# Patient Record
Sex: MALE | URBAN - METROPOLITAN AREA
[De-identification: names, ages, dates, MRNs, and addresses within clinical notes are randomized per-mention and may not be internally consistent; named-entity substitution may affect disease eponyms.]

---

## 2020-11-11 ENCOUNTER — APPOINTMENT (OUTPATIENT)
Dept: URBAN - METROPOLITAN AREA CLINIC 254 | Age: 65
Setting detail: DERMATOLOGY
End: 2020-11-12

## 2020-11-11 VITALS — HEIGHT: 69 IN | WEIGHT: 175 LBS | RESPIRATION RATE: 14 BRPM

## 2020-11-11 DIAGNOSIS — L20.89 OTHER ATOPIC DERMATITIS: ICD-10-CM

## 2020-11-11 DIAGNOSIS — D485 NEOPLASM OF UNCERTAIN BEHAVIOR OF SKIN: ICD-10-CM

## 2020-11-11 PROBLEM — L20.84 INTRINSIC (ALLERGIC) ECZEMA: Status: ACTIVE | Noted: 2020-11-11

## 2020-11-11 PROBLEM — D48.5 NEOPLASM OF UNCERTAIN BEHAVIOR OF SKIN: Status: ACTIVE | Noted: 2020-11-11

## 2020-11-11 PROCEDURE — OTHER PRESCRIPTION: OTHER

## 2020-11-11 PROCEDURE — 88305 TISSUE EXAM BY PATHOLOGIST: CPT

## 2020-11-11 PROCEDURE — OTHER PRESCRIPTION SAMPLES PROVIDED: OTHER

## 2020-11-11 PROCEDURE — OTHER COUNSELING: OTHER

## 2020-11-11 PROCEDURE — OTHER PATHOLOGY BILLING: OTHER

## 2020-11-11 PROCEDURE — 11102 TANGNTL BX SKIN SINGLE LES: CPT

## 2020-11-11 PROCEDURE — OTHER ADDITIONAL NOTES: OTHER

## 2020-11-11 PROCEDURE — OTHER BIOPSY BY SHAVE METHOD: OTHER

## 2020-11-11 PROCEDURE — 99213 OFFICE O/P EST LOW 20 MIN: CPT | Mod: 25

## 2020-11-11 RX ORDER — BETAMETHASONE DIPROPIONATE 0.5 MG/G
0.05% CREAM TOPICAL BID
Qty: 1 | Refills: 2 | Status: ERX | COMMUNITY
Start: 2020-11-11

## 2020-11-11 ASSESSMENT — LOCATION ZONE DERM
LOCATION ZONE: NECK
LOCATION ZONE: SCALP

## 2020-11-11 ASSESSMENT — LOCATION SIMPLE DESCRIPTION DERM
LOCATION SIMPLE: POSTERIOR NECK
LOCATION SIMPLE: POSTERIOR SCALP

## 2020-11-11 ASSESSMENT — LOCATION DETAILED DESCRIPTION DERM
LOCATION DETAILED: MID-OCCIPITAL SCALP
LOCATION DETAILED: LEFT MEDIAL TRAPEZIAL NECK
LOCATION DETAILED: RIGHT MEDIAL TRAPEZIAL NECK

## 2020-11-11 NOTE — PROCEDURE: PATHOLOGY BILLING
Immunohistochemistry ([id number] and [id number]) billing is not performed here. Please use the Immunohistochemistry Stain Billing plan to accomplish this. Immunohistochemistry ([id number] and [id number]) billing is not performed here. Please use the Immunohistochemistry Stain Billing plan to accomplish this.

## 2020-11-11 NOTE — HPI: RASH
How Severe Is Your Rash?: moderate
Is This A New Presentation, Or A Follow-Up?: Follow Up Rash
Additional History: Hx of Sarcoidosis new diagnosis and work with speech pathology for techniques to reduce his coughing

## 2020-11-11 NOTE — PROCEDURE: ADDITIONAL NOTES
Additional Notes: If no improvement discussed ILK\\nPatient will alternate using betemathsone for 2 weeks and eucrisa for 2 weeks
Detail Level: Simple

## 2020-11-11 NOTE — PROCEDURE: BIOPSY BY SHAVE METHOD
Destruction After The Procedure: No
Cryotherapy Text: The wound bed was treated with cryotherapy after the biopsy was performed.
X Size Of Lesion In Cm: 0
Hemostasis: Drysol
Depth Of Biopsy: dermis
Silver Nitrate Text: The wound bed was treated with silver nitrate after the biopsy was performed.
Notification Instructions: Patient will be notified of biopsy results. However, patient instructed to call the office if not contacted within 2 weeks.
Consent: Written consent was obtained and risks were reviewed including but not limited to scarring, infection, bleeding, scabbing, incomplete removal, nerve damage and allergy to anesthesia.
Information: Selecting Yes will display possible errors in your note based on the variables you have selected. This validation is only offered as a suggestion for you. PLEASE NOTE THAT THE VALIDATION TEXT WILL BE REMOVED WHEN YOU FINALIZE YOUR NOTE. IF YOU WANT TO FAX A PRELIMINARY NOTE YOU WILL NEED TO TOGGLE THIS TO 'NO' IF YOU DO NOT WANT IT IN YOUR FAXED NOTE.
Billing Type: Client Bill
Detail Level: Detailed
Curettage Text: The wound bed was treated with curettage after the biopsy was performed.
Anesthesia Volume In Cc (Will Not Render If 0): 0.5
Dressing: bandage
Biopsy Method: Dermablade
Electrodesiccation And Curettage Text: The wound bed was treated with electrodesiccation and curettage after the biopsy was performed.
Anesthesia Type: 1% lidocaine with epinephrine
Post-Care Instructions: I reviewed with the patient in detail post-care instructions. Patient is to keep the biopsy site dry overnight, and then apply bacitracin twice daily until healed. Patient may apply hydrogen peroxide soaks to remove any crusting.
Type Of Destruction Used: Curettage
Electrodesiccation Text: The wound bed was treated with electrodesiccation after the biopsy was performed.
Wound Care: Petrolatum
Was A Bandage Applied: Yes
Biopsy Type: H and E

## 2020-12-29 ENCOUNTER — APPOINTMENT (OUTPATIENT)
Dept: URBAN - METROPOLITAN AREA CLINIC 254 | Age: 65
Setting detail: DERMATOLOGY
End: 2020-12-30

## 2020-12-29 VITALS — WEIGHT: 69 LBS | RESPIRATION RATE: 16 BRPM | HEIGHT: 78 IN

## 2020-12-29 DIAGNOSIS — L20.89 OTHER ATOPIC DERMATITIS: ICD-10-CM

## 2020-12-29 DIAGNOSIS — L70.0 ACNE VULGARIS: ICD-10-CM

## 2020-12-29 PROBLEM — L20.84 INTRINSIC (ALLERGIC) ECZEMA: Status: ACTIVE | Noted: 2020-12-29

## 2020-12-29 PROCEDURE — OTHER COUNSELING: OTHER

## 2020-12-29 PROCEDURE — 99214 OFFICE O/P EST MOD 30 MIN: CPT

## 2020-12-29 PROCEDURE — OTHER PRESCRIPTION: OTHER

## 2020-12-29 RX ORDER — METRONIDAZOLE 7.5 MG/G
0.75% GEL TOPICAL BID
Qty: 1 | Refills: 1 | Status: ERX | COMMUNITY
Start: 2020-12-29

## 2020-12-29 ASSESSMENT — LOCATION SIMPLE DESCRIPTION DERM
LOCATION SIMPLE: INFERIOR FOREHEAD
LOCATION SIMPLE: POSTERIOR NECK

## 2020-12-29 ASSESSMENT — LOCATION DETAILED DESCRIPTION DERM
LOCATION DETAILED: RIGHT MEDIAL TRAPEZIAL NECK
LOCATION DETAILED: INFERIOR MID FOREHEAD
LOCATION DETAILED: LEFT MEDIAL TRAPEZIAL NECK

## 2020-12-29 ASSESSMENT — LOCATION ZONE DERM
LOCATION ZONE: FACE
LOCATION ZONE: NECK

## 2020-12-29 NOTE — PROCEDURE: COUNSELING
Azithromycin Pregnancy And Lactation Text: This medication is considered safe during pregnancy and is also secreted in breast milk.
Minocycline Pregnancy And Lactation Text: This medication is Pregnancy Category D and not consider safe during pregnancy. It is also excreted in breast milk.
Sarecycline Counseling: Patient advised regarding possible photosensitivity and discoloration of the teeth, skin, lips, tongue and gums.  Patient instructed to avoid sunlight, if possible.  When exposed to sunlight, patients should wear protective clothing, sunglasses, and sunscreen.  The patient was instructed to call the office immediately if the following severe adverse effects occur:  hearing changes, easy bruising/bleeding, severe headache, or vision changes.  The patient verbalized understanding of the proper use and possible adverse effects of sarecycline.  All of the patient's questions and concerns were addressed.
Dapsone Counseling: I discussed with the patient the risks of dapsone including but not limited to hemolytic anemia, agranulocytosis, rashes, methemoglobinemia, kidney failure, peripheral neuropathy, headaches, GI upset, and liver toxicity.  Patients who start dapsone require monitoring including baseline LFTs and weekly CBCs for the first month, then every month thereafter.  The patient verbalized understanding of the proper use and possible adverse effects of dapsone.  All of the patient's questions and concerns were addressed.
Benzoyl Peroxide Pregnancy And Lactation Text: This medication is Pregnancy Category C. It is unknown if benzoyl peroxide is excreted in breast milk.
Isotretinoin Pregnancy And Lactation Text: This medication is Pregnancy Category X and is considered extremely dangerous during pregnancy. It is unknown if it is excreted in breast milk.
Include Pregnancy/Lactation Warning?: No
Spironolactone Counseling: Patient advised regarding risks of diarrhea, abdominal pain, hyperkalemia, birth defects (for female patients), liver toxicity and renal toxicity. The patient may need blood work to monitor liver and kidney function and potassium levels while on therapy. The patient verbalized understanding of the proper use and possible adverse effects of spironolactone.  All of the patient's questions and concerns were addressed.
Doxycycline Pregnancy And Lactation Text: This medication is Pregnancy Category D and not consider safe during pregnancy. It is also excreted in breast milk but is considered safe for shorter treatment courses.
Tetracycline Counseling: Patient counseled regarding possible photosensitivity and increased risk for sunburn.  Patient instructed to avoid sunlight, if possible.  When exposed to sunlight, patients should wear protective clothing, sunglasses, and sunscreen.  The patient was instructed to call the office immediately if the following severe adverse effects occur:  hearing changes, easy bruising/bleeding, severe headache, or vision changes.  The patient verbalized understanding of the proper use and possible adverse effects of tetracycline.  All of the patient's questions and concerns were addressed. Patient understands to avoid pregnancy while on therapy due to potential birth defects.
Dapsone Pregnancy And Lactation Text: This medication is Pregnancy Category C and is not considered safe during pregnancy or breast feeding.
High Dose Vitamin A Counseling: Side effects reviewed, pt to contact office should one occur.
Topical Sulfur Applications Pregnancy And Lactation Text: This medication is Pregnancy Category C and has an unknown safety profile during pregnancy. It is unknown if this topical medication is excreted in breast milk.
Topical Clindamycin Pregnancy And Lactation Text: This medication is Pregnancy Category B and is considered safe during pregnancy. It is unknown if it is excreted in breast milk.
Topical Sulfur Applications Counseling: Topical Sulfur Counseling: Patient counseled that this medication may cause skin irritation or allergic reactions.  In the event of skin irritation, the patient was advised to reduce the amount of the drug applied or use it less frequently.   The patient verbalized understanding of the proper use and possible adverse effects of topical sulfur application.  All of the patient's questions and concerns were addressed.
Topical Clindamycin Counseling: Patient counseled that this medication may cause skin irritation or allergic reactions.  In the event of skin irritation, the patient was advised to reduce the amount of the drug applied or use it less frequently.   The patient verbalized understanding of the proper use and possible adverse effects of clindamycin.  All of the patient's questions and concerns were addressed.
Benzoyl Peroxide Counseling: Patient counseled that medicine may cause skin irritation and bleach clothing.  In the event of skin irritation, the patient was advised to reduce the amount of the drug applied or use it less frequently.   The patient verbalized understanding of the proper use and possible adverse effects of benzoyl peroxide.  All of the patient's questions and concerns were addressed.
Topical Retinoid counseling:  Patient advised to apply a pea-sized amount only at bedtime and wait 30 minutes after washing their face before applying.  If too drying, patient may add a non-comedogenic moisturizer. The patient verbalized understanding of the proper use and possible adverse effects of retinoids.  All of the patient's questions and concerns were addressed.
Erythromycin Counseling:  I discussed with the patient the risks of erythromycin including but not limited to GI upset, allergic reaction, drug rash, diarrhea, increase in liver enzymes, and yeast infections.
Spironolactone Pregnancy And Lactation Text: This medication can cause feminization of the male fetus and should be avoided during pregnancy. The active metabolite is also found in breast milk.
Bactrim Pregnancy And Lactation Text: This medication is Pregnancy Category D and is known to cause fetal risk.  It is also excreted in breast milk.
Tazorac Counseling:  Patient advised that medication is irritating and drying.  Patient may need to apply sparingly and wash off after an hour before eventually leaving it on overnight.  The patient verbalized understanding of the proper use and possible adverse effects of tazorac.  All of the patient's questions and concerns were addressed.
Bactrim Counseling:  I discussed with the patient the risks of sulfa antibiotics including but not limited to GI upset, allergic reaction, drug rash, diarrhea, dizziness, photosensitivity, and yeast infections.  Rarely, more serious reactions can occur including but not limited to aplastic anemia, agranulocytosis, methemoglobinemia, blood dyscrasias, liver or kidney failure, lung infiltrates or desquamative/blistering drug rashes.
Doxycycline Counseling:  Patient counseled regarding possible photosensitivity and increased risk for sunburn.  Patient instructed to avoid sunlight, if possible.  When exposed to sunlight, patients should wear protective clothing, sunglasses, and sunscreen.  The patient was instructed to call the office immediately if the following severe adverse effects occur:  hearing changes, easy bruising/bleeding, severe headache, or vision changes.  The patient verbalized understanding of the proper use and possible adverse effects of doxycycline.  All of the patient's questions and concerns were addressed.
Detail Level: Simple
Azithromycin Counseling:  I discussed with the patient the risks of azithromycin including but not limited to GI upset, allergic reaction, drug rash, diarrhea, and yeast infections.
Erythromycin Pregnancy And Lactation Text: This medication is Pregnancy Category B and is considered safe during pregnancy. It is also excreted in breast milk.
Detail Level: Zone
Tazorac Pregnancy And Lactation Text: This medication is not safe during pregnancy. It is unknown if this medication is excreted in breast milk.
Minocycline Counseling: Patient advised regarding possible photosensitivity and discoloration of the teeth, skin, lips, tongue and gums.  Patient instructed to avoid sunlight, if possible.  When exposed to sunlight, patients should wear protective clothing, sunglasses, and sunscreen.  The patient was instructed to call the office immediately if the following severe adverse effects occur:  hearing changes, easy bruising/bleeding, severe headache, or vision changes.  The patient verbalized understanding of the proper use and possible adverse effects of minocycline.  All of the patient's questions and concerns were addressed.
High Dose Vitamin A Pregnancy And Lactation Text: High dose vitamin A therapy is contraindicated during pregnancy and breast feeding.
Topical Retinoid Pregnancy And Lactation Text: This medication is Pregnancy Category C. It is unknown if this medication is excreted in breast milk.
Isotretinoin Counseling: Patient should get monthly blood tests, not donate blood, not drive at night if vision affected, not share medication, and not undergo elective surgery for 6 months after tx completed. Side effects reviewed, pt to contact office should one occur.
Birth Control Pills Pregnancy And Lactation Text: This medication should be avoided if pregnant and for the first 30 days post-partum.
Birth Control Pills Counseling: Birth Control Pill Counseling: I discussed with the patient the potential side effects of OCPs including but not limited to increased risk of stroke, heart attack, thrombophlebitis, deep venous thrombosis, hepatic adenomas, breast changes, GI upset, headaches, and depression.  The patient verbalized understanding of the proper use and possible adverse effects of OCPs. All of the patient's questions and concerns were addressed.

## 2022-11-29 ENCOUNTER — APPOINTMENT (OUTPATIENT)
Dept: URBAN - METROPOLITAN AREA CLINIC 254 | Age: 67
Setting detail: DERMATOLOGY
End: 2022-12-03

## 2022-11-29 VITALS — HEIGHT: 69 IN | RESPIRATION RATE: 14 BRPM | WEIGHT: 150 LBS

## 2022-11-29 DIAGNOSIS — D86.3 SARCOIDOSIS OF SKIN: ICD-10-CM

## 2022-11-29 DIAGNOSIS — L20.89 OTHER ATOPIC DERMATITIS: ICD-10-CM

## 2022-11-29 DIAGNOSIS — L82.1 OTHER SEBORRHEIC KERATOSIS: ICD-10-CM

## 2022-11-29 DIAGNOSIS — L85.3 XEROSIS CUTIS: ICD-10-CM

## 2022-11-29 PROCEDURE — 99214 OFFICE O/P EST MOD 30 MIN: CPT

## 2022-11-29 PROCEDURE — OTHER COUNSELING: OTHER

## 2022-11-29 PROCEDURE — OTHER PRESCRIPTION: OTHER

## 2022-11-29 PROCEDURE — OTHER MIPS QUALITY: OTHER

## 2022-11-29 RX ORDER — TRIAMCINOLONE ACETONIDE 1 MG/G
0.1% CREAM TOPICAL BID
Qty: 1 | Refills: 1 | Status: ERX | COMMUNITY
Start: 2022-11-29

## 2022-11-29 RX ORDER — TACROLIMUS 1 MG/G
0.1% OINTMENT TOPICAL BID
Qty: 30 | Refills: 3 | Status: ERX | COMMUNITY
Start: 2022-11-29

## 2022-11-29 ASSESSMENT — LOCATION ZONE DERM
LOCATION ZONE: FACE
LOCATION ZONE: FEET
LOCATION ZONE: NECK

## 2022-11-29 ASSESSMENT — LOCATION DETAILED DESCRIPTION DERM
LOCATION DETAILED: LEFT PLANTAR FOREFOOT OVERLYING 3RD METATARSAL
LOCATION DETAILED: RIGHT PLANTAR FOREFOOT OVERLYING 2ND METATARSAL
LOCATION DETAILED: LEFT SUPERIOR LATERAL BUCCAL CHEEK
LOCATION DETAILED: RIGHT MID PREAURICULAR CHEEK
LOCATION DETAILED: LEFT SUPERIOR LATERAL NECK
LOCATION DETAILED: RIGHT INFERIOR CENTRAL MALAR CHEEK
LOCATION DETAILED: RIGHT SUPERIOR LATERAL BUCCAL CHEEK
LOCATION DETAILED: RIGHT SUPERIOR LATERAL NECK

## 2022-11-29 ASSESSMENT — LOCATION SIMPLE DESCRIPTION DERM
LOCATION SIMPLE: RIGHT PLANTAR SURFACE
LOCATION SIMPLE: LEFT ANTERIOR NECK
LOCATION SIMPLE: RIGHT ANTERIOR NECK
LOCATION SIMPLE: RIGHT CHEEK
LOCATION SIMPLE: LEFT CHEEK
LOCATION SIMPLE: LEFT PLANTAR SURFACE

## 2022-11-29 NOTE — PROCEDURE: COUNSELING
Detail Level: Detailed
Patient Specific Counseling (Will Not Stick From Patient To Patient): - Recommend applying Eucerin lotion bid x 2 weeks then once daily after. Samples were provided today. \\n- Recommend scented free dove bar soap and Free and clear detergents. Free and clear samples were provided today.
Patient Specific Counseling (Will Not Stick From Patient To Patient): - As patient was recently been diagnosed with sarcoidosis, discussed with patient that he could get sarcoidosis of the skin as well.\\n- Topical Steroids Counseling: I discussed with the patient that prolonged use of topical steroids can result in the increased appearance of superficial blood vessels (telangiectasias), lightening (hypopigmentation) and thinning of the skin (atrophy).  Patient understands to avoid using high potency steroids in skin folds, the groin or the face.  The patient verbalized understanding of the proper use and possible adverse effects of topical steroids.  All of the patient's questions and concerns were addressed.\\n-Protopic Counseling: Patient may experience a mild burning sensation during topical application. Protopic is not approved in children less than 2 years of age. There have been case reports of hematologic and skin malignancies in patients using topical calcineurin inhibitors although causality is questionable.

## 2023-04-03 ENCOUNTER — TRANSFERRED RECORDS (OUTPATIENT)
Dept: HEALTH INFORMATION MANAGEMENT | Facility: CLINIC | Age: 68
End: 2023-04-03
Payer: COMMERCIAL

## 2023-04-04 ENCOUNTER — MEDICAL CORRESPONDENCE (OUTPATIENT)
Dept: HEALTH INFORMATION MANAGEMENT | Facility: CLINIC | Age: 68
End: 2023-04-04
Payer: COMMERCIAL

## 2023-04-06 ENCOUNTER — TRANSCRIBE ORDERS (OUTPATIENT)
Dept: OTHER | Age: 68
End: 2023-04-06

## 2023-04-06 DIAGNOSIS — J38.00 VOCAL CORD PARESIS: Primary | ICD-10-CM

## 2023-04-06 DIAGNOSIS — R49.0 DYSPHONIA: ICD-10-CM

## 2023-04-13 NOTE — TELEPHONE ENCOUNTER
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], Dysphonia [R49.0], ref prov: Vahid Quinones MD from [facility], confirmed [facility] location    RECORDS REQUESTED FROM:       Clinic name Comments Records Status Imaging Status   [facility],  4/4/23, 4/3/23- note and referral from Vahid Quinones MD Scanned in epic     [facility] 3/19/23- note w/ Deep Romero MD    12/13/22- note with Yusra Dos Santos NP   12/7/22- note with Luigi Hicks MD   11/16/20- note with Adelaida Mustafa MD        12/8/22- laparoscopic   12/9/21- EGD    More records in CE  CE    [facility]  2/24/23- CT Neck   2/2/23- CT Chest   1/11/23- XR Esophagus   6/6/22- XR Esophagus  5/9/22- XR Chest   4/21/22- XR Esophagus  4/14/22- XR Chest   10/8/21- XR Esophagus   10/8/21- XR Video Swallow  2/12/21- CT Neck   3/13/20- CT Chest   2/21/20- XR Chest   9/27/19- CT Chest  [facility] 4/13/23-  Pending req    -PACS                        April 13, 2023 1:37 PM - Faxed a request to Ana to push Image to [facility] PACS- Suki   April 20, 2023 9:41 AM - Received image in pacs and attached it to patient- Suki

## 2023-05-09 ENCOUNTER — OFFICE VISIT (OUTPATIENT)
Dept: OTOLARYNGOLOGY | Facility: CLINIC | Age: 68
End: 2023-05-09
Attending: OTOLARYNGOLOGY
Payer: COMMERCIAL

## 2023-05-09 ENCOUNTER — THERAPY VISIT (OUTPATIENT)
Dept: SPEECH THERAPY | Facility: CLINIC | Age: 68
End: 2023-05-09
Payer: COMMERCIAL

## 2023-05-09 ENCOUNTER — PRE VISIT (OUTPATIENT)
Dept: OTOLARYNGOLOGY | Facility: CLINIC | Age: 68
End: 2023-05-09

## 2023-05-09 DIAGNOSIS — J38.00 VOCAL CORD PARESIS: Primary | ICD-10-CM

## 2023-05-09 DIAGNOSIS — R49.0 DYSPHONIA: Primary | ICD-10-CM

## 2023-05-09 DIAGNOSIS — J38.00 VOCAL CORD PARESIS: ICD-10-CM

## 2023-05-09 DIAGNOSIS — R13.12 OROPHARYNGEAL DYSPHAGIA: ICD-10-CM

## 2023-05-09 DIAGNOSIS — R13.10 DYSPHAGIA, UNSPECIFIED TYPE: ICD-10-CM

## 2023-05-09 PROCEDURE — 92613 ENDOSCOPY SWALLOW (FEES) I&R: CPT | Performed by: OTOLARYNGOLOGY

## 2023-05-09 PROCEDURE — 92524 BEHAVRAL QUALIT ANALYS VOICE: CPT | Mod: GN | Performed by: SPEECH-LANGUAGE PATHOLOGIST

## 2023-05-09 PROCEDURE — 92612 ENDOSCOPY SWALLOW (FEES) VID: CPT | Mod: GN | Performed by: OTOLARYNGOLOGY

## 2023-05-09 PROCEDURE — 99204 OFFICE O/P NEW MOD 45 MIN: CPT | Mod: 25 | Performed by: OTOLARYNGOLOGY

## 2023-05-09 PROCEDURE — 92610 EVALUATE SWALLOWING FUNCTION: CPT | Mod: GN | Performed by: SPEECH-LANGUAGE PATHOLOGIST

## 2023-05-09 NOTE — LETTER
5/9/2023       RE: Bandar Guevara  [address]  [place] [address]     Dear Colleague,    Thank you for referring your patient, Bandar Guevara, to the [facility]ICE [facility] [place] [facility]. Please see a copy of my visit note below.    [facility] VOICE [facility]  CLINICAL EVALUATION REPORT    Patient: Bandar Guevara  Date of Service: 5/9/2023  Seen in conjunction with: Dr. Keysha Leone  Referring physician: Dr. Quinones    HISTORY  PATIENT INFORMATION  Bandar Guevara is a 67 year old presenting today for initial evaluation of voice and resonance. Salient details of his symptom history are as follows:    This patient has a complex medical history significant for sarcoidosis, heart disease, and esophageal achalasia, status post Nissen fundoplication and takedown who presents today for evaluation of voice in the context of idiopathic bilateral vocal fold immobility.    The patient first began to notice slight degradation of voice quality 3 to 4 years ago without obvious inciting event.  Symptoms gradually worsened over time, prompting [facility] Quinones.  Evaluation showed a right sided vocal fold paralysis, as well as a left-sided vocal fold paresis.  He underwent a type I thyroplasty injection augmentation over a year ago, and had brief improvement for 2 to 3 weeks, after which her voice quality returned to baseline.  He underwent a subsequent injection in early 2023, which had minimal improvement in voice quality.  At his most recent exam on 4/3/2023, ongoing bilateral limited vocal fold mobility was observed, and there was concerned that repeated laryngeal injection or type II thyroplasty could result in airway compromise.  For this reason, he was referred to our clinic for further evaluation.    Previous work-up for possible causes of vocal fold immobility has been negative.  The patient has not had any extended duration intubation, brain  "imaging has been grossly normal.    In addition to voice consequences of vocal fold mobility, the patient reports breathing difficulties.  Specifically, he becomes winded during connected speech, and if he coughs, he experiences a sensation of tightness in his throat with associated inspiratory stridor that goes on for several minutes.    The patient has swallowing difficulties associated with esophageal achalasia.  Per patient report, he has approximately \"10% function\" of his esophagus.  This resulted in frequent volume oral reflux with positional changes.  This often wakes him up in the middle of the night with coughing fits and breathing difficulty described above.  He had a previous videofluoroscopic swallow study at [facility] in 2021, which showed grossly normal oropharyngeal swallow function.    OBJECTIVE FINDINGS  PATIENT REPORTED MEASURES  Patient Supplied Answers To [employer] Intake Voice Questionnaire       View : No data to display.                 Patient Supplied Answers To VHI Questionnaire       View : No data to display.                 Patient Supplied Answers To CSI Questionnaire       View : No data to display.                 Patient Supplied Answers To EAT Questionnaire       View : No data to display.                  Self-Ratings as discussed with patient:       View : No data to display.                 PERCEPTUAL EVALUATION (21451)  VOICE/ SPEECH/ NON-COMMUNICATIVE LARYNGEAL BEHAVIORS EVALUATION  Breathing pattern:   Very small breath group size, intermittent inspiratory stridor observed with walking  Cough/ Throat clear:  Very weak and intermittently wet sounding   Bandar states today is a typical voice day, with clinician observing voice quality characterized by:  Roughness: WNL  Breathiness: Profound  Strain: Profound  Habitual pitch could not be determined due to brevity of phonation  Loudness is profoundly reduced   Maximum Phonation Time: Less than 2 seconds    GRADE, ROUGHNESS, " BREATHINESS, ASTHENIA, STRAIN  (GRBAS) scale:  G(3)R(0)B(3)A(3)S(3)    LARYNGEAL EXAMINATION  Procedure: Flexible endoscopy with chip-tip technology without stroboscopy, right nostril; topical anesthesia with 3% Lidocaine and 0.25% phenylephrine was not applied.   Performed by: Dr. Keysha Leone  Verbal consent was obtained and witnessed prior to this procedure.   A time-out was performed, verifying patient, procedure, and site.   This exam shows:  Velar Function: Intermittent bubbling of secretions during sustained voiceless sibilants  Secretions:  Intermittent presence of thickened secretions on the vocal folds and throughout the laryngeal area  Laryngeal Mucosa: essentially healthy laryngeal mucosa and Very prominent vocal process and bowed appearing true vocal fold bilaterally  Vocal fold mucosa:    RTVF - within normal limits, no visible lesions  LTVF - within normal limits, no visible lesions  Vocal fold function:   Very limited abduction of the vocal folds bilaterally with position of both resting near midline.  There does appear to be some degree of both abduction and abduction bilaterally, left greater than right, but total range of motion is severely limited  Airway is limited  Elongation of the vocal folds for pitch increase is truncated    ASSESSMENT / PLAN  IMPRESSIONS: Bandar Guevara is a 67 year old with profound Dysphonia (R49.0) characterized by breathiness and severely reduced loudness, as well as intermittent inspiratory stridor in the context of suspected laryngospasm following laryngopharyngeal reflux events due to esophageal achalasia.  Laryngeal exam demonstrates severely reduced bilateral vocal fold mobility with near fixation near the midline and very subtle abductory and abductory movement bilaterally, left greater than right.  Dr. Leone plans to take the patient to the operating room to determine whether a posterior glottic scar band could account for some aspects of limited vocal fold mobility.   In addition, it is recommended that the patient undergo a brief course of speech therapy (1 session), targeted at optimizing respiratory mechanics in the context of limited glottic airway.    Goals:  Patient goal:    To understand the problem and fix it as much as possible     Long and Short-term goal(s): Within the first 4 sessions, Bandar will:  -- demonstrate silent inhalation and abdominal breathing pattern in order to optimize breathing mechanics with 90% accy and min cues.    This treatment plan was developed with the patient who agreed with the recommendations.    TOTAL SERVICE TIME: 60 minutes  EVALUATION OF VOICE AND RESONANCE (46156)  NO CHARGE FACILITY FEE (29445)    Speech recognition software may have been used in this documentation; input is reviewed before signature to the best of my ability.     Luigi Barber, Ph.D., [facility]-SLP  Speech-Language Pathologist-[facility]  [phone]  he/him/his          Again, thank you for allowing me to participate in the care of your patient.      Sincerely,    Speech Language Pathologist

## 2023-05-09 NOTE — LETTER
2023       RE: Bandar Guevara  [address]  [facility] [address]     Dear Colleague,    Thank you for referring your patient, Bandar Guevara, to the [facility] [place] at [facility]. Please see a copy of my visit note below.        [facility]   at the [facility]   Otolaryngology Clinic     Patient: Bandar Guevara    MRN: [id number]    : 1955    Age/Gender: 67 year old male  Date of Service: 2023  Rendering Provider:   Keysha Leone MD     Referring Provider   PCP: No Ref-Primary, Physician  Referring Physician: Vahid Quinones MD  [address] [facility]  [place] [address]  Reason for Consultation   Bilateral vocal fold paralysis  Dysphonia  History   HISTORY OF PRESENT ILLNESS: Mr. Guevara is a 67 year old male who presents to us today with dysphonia.      Of note patient has history of sarcoidosis s/p lung biopsy. History of dysphagia followed by GI with several dilations and Heller myotomy.      he presents today for evaluation. 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   - started 3-4 years ago with no inciting incident   - history of achalasia   - had two injections with ENT at [place]  - first one with moderate improvement for 2-4 weeks  - after last one had no improvement   - currently does not talk very much   - gets winded with voice use   - outside ENT who performed injections had concern for breathing issues with further injections     Dysphagia  - eats fully by mouth   - working with nutrition   - had swallow study  - sometimes coughs with liquids   - denies history of pneumonia    Dyspnea  - sometimes has  inspiratory stridor when coughing or trying to catch his breath   - snores a lot at night  - not significantly bothersome to him     Throat clearing/cough  - coughs occasionally     GERD/LPRD   - wakes up at night with regurgitation   - also whenever he brushes his teeth or bends over     PAST MEDICAL HISTORY: No past medical history on file.    PAST SURGICAL HISTORY: No past surgical history on file.    CURRENT MEDICATIONS: No current outpatient medications on file.    ALLERGIES: Patient has no allergy information on record.    SOCIAL HISTORY:    Social History     Socioeconomic History    Marital status:      Spouse name: Not on file    Number of children: Not on file    Years of education: Not on file    Highest education level: Not on file   Occupational History    Not on file   Tobacco Use    Smoking status: Not on file    Smokeless tobacco: Not on file   Substance and Sexual Activity    Alcohol use: Not on file    Drug use: Not on file    Sexual activity: Not on file   Other Topics Concern    Not on file   Social History Narrative    Not on file     Social Determinants of Health     Financial Resource Strain: Not on file   Food Insecurity: Not on file   Transportation Needs: Not on file   Physical Activity: Not on file   Stress: Not on file   Social Connections: Not on file   Intimate Partner Violence: Not on file   Housing Stability: Not on file         FAMILY HISTORY: No family history on file.  Non-contributory for problems with anesthesia    REVIEW OF SYSTEMS:   The patient was asked a 14 point review of systems regarding constitutional symptoms, eye symptoms, ears, nose, mouth, throat symptoms, cardiovascular symptoms, respiratory symptoms, gastrointestinal symptoms, genitourinary symptoms, musculoskeletal symptoms, integumentary symptoms, neurological symptoms, psychiatric symptoms, endocrine symptoms, hematologic/lymphatic symptoms, and allergic/ immunologic symptoms.   The pertinent factors have  been included in the HPI and below.  Patient Supplied Answers to Review of Systems       View : No data to display.                Physical Examination   The patient underwent a physical examination as described below. The pertinent positive and negative findings are summarized after the description of the examination.  Constitutional: The patient's developmental and nutritional status was assessed. The patient's voice quality was assessed.  Head and Face: The head and face were inspected for deformities. The sinuses were palpated. The salivary glands were palpated. Facial muscle strength was assessed bilaterally.  Eyes: Extraocular movements and primary gaze alignment were assessed.  Ears, Nose, Mouth and Throat: The ears and nose were examined for deformities. The nasal septum, mucosa, and turbinates were inspected by anterior rhinoscopy. The lips, teeth, and gums were examined for abnormalities. The oral mucosa, tongue, palate, tonsils, lateral and posterior pharynx were inspected for the presence of asymmetry or mucosal lesions.    Neck: The tracheal position was noted, and the neck mass palpated to determine if there were any asymmetries, abnormal neck masses, thyromegally, or thyroid nodules.  Respiratory: The nature of the breathing and chest expansion/symmetry was observed.  Cardiovascular: The patient was examined to determine the presence of any edema or jugular venous distension.  Abdomen: The contour of the abdomen was noted.  Lymphatic: The patient was examined for infraclavicular lymphadenopathy.  Musculoskeletal: The patient was inspected for the presence of skeletal deformities.  Extremities: The extremities were examined for any clubbing or cyanosis.  Skin: The skin was examined for inflammatory or neoplastic conditions.  Neurologic: The patient's orientation, mood, and affect were noted. The cranial nerve  functions were examined.  Other pertinent positive and negative findings on physical  examination:      OC/OP: no lesions, uvula midline, soft palate elevates symmetrically   Neck: no lesions, no TH tenderness to palpation    All other physical examination findings were within normal limits and noncontributory.  Procedures   Flexible laryngoscopy (CPT 68384)      Pre-procedure diagnosis: dysphonia   Post-procedure diagnosis: same as above  Indication for procedure: Mr. Guevara is a 67 year old male with see above  Procedure(s): Fiberoptic Laryngoscopy    Details of Procedure: After informed consent was obtained, the patient was seated in the examination chair.  The areas of the nasopharynx as well as the hypopharynx were anesthetized with topical 4% lidocaine with 0.25% phenylephrine atomizer.  Examination of the base of tongue was performed first.  Attention was directed to any evidence of masses in the area or evidence of leukoplakia or candidal infection.  Attention was directed to the epiglottis where its size and position was determined and its movement on phonation of the vowel  e .  The piriform sinuses were then inspected for any mass lesions or pooling of secretions.  Attention was then directed to the larynx. The vocal folds were inspected for infection or any areas of leukoplakia, for masses, polypoid degeneration, or hemorrhage.  Having done this, the arytenoids and vocal processes were inspected for erythema or evidence of granuloma formation.  The posterior commissure was then inspected for evidence of inflammatory changes in the mucosa and heaping up of mucosal tissue. The patient was then instructed to say the vowel  e .  Adduction of vocal folds to the midline was observed for any evidence of paresis or paralysis of the larynx or asymmetry in rotation of the larynx to the left or right. The patient was asked to breathe and the degree of abduction was noted bilaterally.  Subglottic view of the larynx was obtained for any additional mass lesions or mucosal changes.  Finally the post  cricoid was examined for evidence of pooling of secretions, as well as the pharyngeal wall mucosa.   Anesthesia type: 0.25% phenylephrine    Findings:  Anatomic/physiological deviations: RNC, bilateral vocal fold paralysis with severe vocal fold atrophy   Right vocal process: Marked restriction of mobility   Left vocal process: Marked restriction of mobility  Glottal gap: Glottal gap  Supraglottic structures: Normal  Hypopharynx: Normal     Estimated Blood Loss: minimal  Complications: None  Disposition: Patient tolerated the procedure well                   Fiberoptic Endoscopic Evaluation of Swallowing (CPT 83248)  and Interpretation of Swallowing (CPT 66252)    Indications: See above notes for complete history and physical. Patient complains of dysphagia to liquids and/or there is suggestion on history and endoscopic exam of the presence of dysphagia causing medical complaints.  Swallowing evaluation is being performed to assess the presence and degree of dysphagia, and to recommend a safe diet.     Pulmonary Status:  No PNA   Current Diet:              regular                                        thin liquids      Consistency Amounts:  Thin Liquid: sip   Puree: 1 tsp  Solid: cookies            Positioning: upright in a chair  Oral Peripheral Exam: See physical exam section.  Anatomic Notes: See Videostroboscopy report for assessment of anatomy and laryngeal functioning  Pharyngeal secretions prior to administration of liquid or food: Yes  Oral Phase Abnormal Findings: No abnormal behavior observed  Behavioral Adaptations: No abnormal behavior observed  Pharyngeal Phase Abnormal Findings: no penetration, no aspiration    Recommended Diet:  regular                                        thin liquids              Review of Relevant Clinical Data   I personally reviewed:  Notes:   [facility] 4/3/23      Pulmonology [facility] 3/7/23  Assessment/Plan:   Mr. Guevara is a 67-year-old male who sees me for chronic  cough. Patient has had thorough evaluation and treatment for multiple chronic cough conditions including airway disease/asthma, reflux and upper airway cough syndrome without improvement. I feel the patient does likely have sarcoidosis, however, treatment with prednisone failed to resolve cough. Dysphagia, reflux and vomiting is likely driving symptoms.    Lung nodules, sarcoidosis  -Repeat CT chest in one year  -Stop Symbicort    Procedure note 12/8/22  Indications:  Bandar simon is a 66-year-old gentleman who had a previous Heller myotomy and Tu fundoplication. He had progression of disease, achalasia, and a weakening esophagus. He was having increasing dysphagia and we discussed takedown of the Tu fundoplication. We discussed issues that included, but were not limited to, anesthetic risk, bleeding, infection, heart attack, stroke, death, injury to other organs, postoperative expectations and limitations, reoperation, leaks, etc. We discussed the possibility that he derives no benefit from the operation. He elected to proceed after asking appropriate questions.     Radiology:   The images were reviewed and interpreted of CT neck soft tissue 2/24/23  Shows right vocal fold with CAHA implant     Impression  1. No new adenopathy.   2. A previously noted enlarged partially calcified pretracheal node on the right has decreased slightly in size.   3. Normal glottis with symmetric vocal cords. Since the previous exam, interval postprocedural changes of injection laryngoplasty on the right.   4. Cervical spondylosis. No prevertebral soft tissue swelling          CT chest 2/2/23  IMPRESSION:   1.  Bilateral pulmonary nodules predominantly in the mid and upper lungs including subpleural nodules along the major fissures. The majority of the nodules have increased in size mildly since 03/13/2020. Benign bilateral calcified granulomas.   2.  Partially calcified mild mediastinal and bilateral hilar lymphadenopathy unchanged.   3.   Constellation of findings can be seen with sarcoidosis or previous granulomatous infection.    XR esophagus 1/11/23  Impression:   Esophageal stasis was again noted with tertiary contractions suggesting dysmotility.   No evidence of mass or stricture.   Gastroesophageal reflux was not well evaluated, though is suspected.      XR chest 5/9/22  IMPRESSION:   Heart size and pulmonary vascularity within normal limits. No focal pulmonary infiltrates. Hypertrophic changes thoracolumbar spine with mild thoracolumbar curve.    Xray Video Swallow Exam 10/8/21  IMPRESSION :   No visualized laryngeal penetration or tracheal aspiration.      Procedures:   PFT 2/2/23  Conclusion:   Normal pulmonary function tests.      EGD 12/9/21  Findings:  Esophagus - Dilated and aperistaltic, consistent with achalasia. No retained food. The EsoFlip 30 mm achalasia dilation balloon was advanced across the GEJ and inflated to 60 ccs, and held in place for 60 seconds.   The balloon was then withdrawn; there was blood on the balloon post-dilation, and there was a small tear across the GEJ, but no bleeding or perforation was noted.  Stomach - normal.  Duodenum - normal  Impressions/Post-Op Diagnosis: Achalasia; treated with the 30 mm EsoFlip balloon    Labs:  No results found for: TSH  No results found for: NA, CO2, BUN, CREAT, GLUCOSE, PHOS  No results found for: WBC, HGB, HCT, MCV, PLT  No results found for: PT, PTT, INR  No results found for: AHSAN  No components found for: RHEUMATOIDFACTOR,  RF  No results found for: CRP  No components found for: CKTOT, URICACID  No components found for: C3, C4, DSDNAAB, NDNAABIFA  No results found for: MPOAB    Patient reported Quality of Life (QOL) Measures   Patient Supplied Answers To VHI Questionnaire       View : No data to display.                  Patient Supplied Answers To EAT Questionnaire       View : No data to display.                  Patient Supplied Answers To CSI Questionnaire       View : No  data to display.                  @[email or web address]@    Impression & Plan     IMPRESSION: Mr. Guevara is a 67 year old male who is being seen for the following:    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   - history of sarcoidosis s/p lung biopsy with subsequent cough for 3 years   - has received right vocal fold augmentation through [facility] with benefit for about 2 weeks   - now voice is very weak and gets winded with voice use   - scope shows bilateral vocal fold paralysis with severe vocal fold atrophy  - FEES shows no penetration, no aspiration   - discussed etiology of vocal fold paralysis in this case is unclear   - discussed option of diagnostic MDL to evaluate breathing with evaluation of scar band formation and if not consideration of suture lateralization or trach placement  Plan  - obtain records from ENT in [place] to [place] if there has been a progression   - review outside imaging   - will review his case further and he will follow-up virtually in 2 weeks for further discussion of next steps       RETURN VISIT: 2 weeks via virtual visit    Thank you for the kind referral and for allowing me to share in the care of Mr. Guevara. If you have any questions, please do not hesitate to contact me.    Scribe Disclosure:  IKaylin am serving as a scribe to document services personally performed by Keysha Leone MD at this visit, based upon the provider's statements to me. All documentation has been reviewed by the aforementioned provider prior to being entered into the official medical record.     Keysha Leone MD    Laryngology    [facility]  Department of  Otolaryngology - Head and Neck Surgery  Clinics & Surgery Center  [address] [place] [address]  Appointment line: [phone]  Fax: [phone]  [email or web address]

## 2023-05-09 NOTE — PROGRESS NOTES
[facility] VOICE CLINIC  CLINICAL EVALUATION REPORT    Patient: Bandar Guevara  Date of Service: 5/9/2023  Seen in conjunction with: Dr. Keysha Leone  Referring physician: Dr. Quinones    HISTORY  PATIENT INFORMATION  Bandar Guevara is a 67 year old presenting today for initial evaluation of voice and resonance. Salient details of his symptom history are as follows:    This patient has a complex medical history significant for sarcoidosis, heart disease, and esophageal achalasia, status post Nissen fundoplication and takedown who presents today for evaluation of voice in the context of idiopathic bilateral vocal fold immobility.    The patient first began to notice slight degradation of voice quality 3 to 4 years ago without obvious inciting event.  Symptoms gradually worsened over time, prompting him to be evaluated at [facility] by Dr. Quinones.  Evaluation showed a right sided vocal fold paralysis, as well as a left-sided vocal fold paresis.  He underwent a type I thyroplasty injection augmentation over a year ago, and had brief improvement for 2 to 3 weeks, after which her voice quality returned to baseline.  He underwent a subsequent injection in early 2023, which had minimal improvement in voice quality.  At his most recent exam on 4/3/2023, ongoing bilateral limited vocal fold mobility was observed, and there was concerned that repeated laryngeal injection or type II thyroplasty could result in airway compromise.  For this reason, he was referred to our clinic for further evaluation.    Previous work-up for possible causes of vocal fold immobility has been negative.  The patient has not had any extended duration intubation, brain imaging has been grossly normal.    In addition to voice consequences of vocal fold mobility, the patient reports breathing difficulties.  Specifically, he becomes winded during connected speech, and if he coughs, he experiences a sensation of tightness in his throat with associated inspiratory  "stridor that goes on for several minutes.    The patient has swallowing difficulties associated with esophageal achalasia.  Per patient report, he has approximately \"10% function\" of his esophagus.  This resulted in frequent volume oral reflux with positional changes.  This often wakes him up in the middle of the night with coughing fits and breathing difficulty described above.  He had a previous videofluoroscopic swallow study at [facility] in 2021, which showed grossly normal oropharyngeal swallow function.    OBJECTIVE FINDINGS  PATIENT REPORTED MEASURES  Patient Supplied Answers To [facility] Questionnaire       View : No data to display.                 Patient Supplied Answers To VHI Questionnaire       View : No data to display.                 Patient Supplied Answers To CSI Questionnaire       View : No data to display.                 Patient Supplied Answers To EAT Questionnaire       View : No data to display.                    Self-Ratings as discussed with patient:       View : No data to display.                 PERCEPTUAL EVALUATION (93235)  VOICE/ SPEECH/ NON-COMMUNICATIVE LARYNGEAL BEHAVIORS EVALUATION    Breathing pattern:     Very small breath group size, intermittent inspiratory stridor observed with walking    Cough/ Throat clear:    Very weak and intermittently wet sounding     Bandar states today is a typical voice day, with clinician observing voice quality characterized by:    Roughness: WNL    Breathiness: Profound    Strain: Profound    Habitual pitch could not be determined due to brevity of phonation    Loudness is profoundly reduced     Maximum Phonation Time: Less than 2 seconds    GRADE, ROUGHNESS, BREATHINESS, ASTHENIA, STRAIN  (GRBAS) scale:  G(3)R(0)B(3)A(3)S(3)    LARYNGEAL EXAMINATION  Procedure: Flexible endoscopy with chip-tip technology without stroboscopy, right nostril; topical anesthesia with 3% Lidocaine and 0.25% phenylephrine was not applied.   Performed " by: Dr. Keysha Leone  Verbal consent was obtained and witnessed prior to this procedure.   A time-out was performed, verifying patient, procedure, and site.   This exam shows:    Velar Function: Intermittent bubbling of secretions during sustained voiceless sibilants    Secretions:  Intermittent presence of thickened secretions on the vocal folds and throughout the laryngeal area    Laryngeal Mucosa: essentially healthy laryngeal mucosa and Very prominent vocal process and bowed appearing true vocal fold bilaterally    Vocal fold mucosa:    o RTVF - within normal limits, no visible lesions  o LTVF - within normal limits, no visible lesions    Vocal fold function:   o Very limited abduction of the vocal folds bilaterally with position of both resting near midline.  There does appear to be some degree of both abduction and abduction bilaterally, left greater than right, but total range of motion is severely limited    Airway is limited    Elongation of the vocal folds for pitch increase is truncated    ASSESSMENT / PLAN  IMPRESSIONS: Bandar Guevara is a 67 year old with profound Dysphonia (R49.0) characterized by breathiness and severely reduced loudness, as well as intermittent inspiratory stridor in the context of suspected laryngospasm following laryngopharyngeal reflux events due to esophageal achalasia.  Laryngeal exam demonstrates severely reduced bilateral vocal fold mobility with near fixation near the midline and very subtle abductory and abductory movement bilaterally, left greater than right.  Dr. Leone plans to take the patient to the operating room to determine whether a posterior glottic scar band could account for some aspects of limited vocal fold mobility.  In addition, it is recommended that the patient undergo a brief course of speech therapy (1 session), targeted at optimizing respiratory mechanics in the context of limited glottic airway.    Goals:  Patient goal:    To understand the problem and fix it as  much as possible     Long and Short-term goal(s): Within the first 4 sessions, Bandar will:  -- demonstrate silent inhalation and abdominal breathing pattern in order to optimize breathing mechanics with 90% accy and min cues.    This treatment plan was developed with the patient who agreed with the recommendations.    TOTAL SERVICE TIME: 60 minutes  EVALUATION OF VOICE AND RESONANCE (26186)  NO CHARGE FACILITY FEE (11259)    Speech recognition software may have been used in this documentation; input is reviewed before signature to the best of my ability.     Luigi Barber, Ph.D., [facility]-SLP  Speech-Language Pathologist-[facility]  [phone]  he/him/his

## 2023-05-09 NOTE — PATIENT INSTRUCTIONS
1.  You were seen in the ENT Clinic today by Dr. Leone. If you have any questions or concerns after your appointment, please call [phone]. Press option #1 for scheduling related needs. Press option #3 for Nurse advice.    2.    Plan is to return to clinic 2 week follow up       Vickie Mñuoz  [phone]  [facility] - Otolaryngology

## 2023-05-09 NOTE — PROGRESS NOTES
[facility]   at the [facility]   Otolaryngology Clinic     Patient: Bandar Guevara    MRN: [id number]    : 1955    Age/Gender: 67 year old male  Date of Service: 2023  Rendering Provider:   Keysha Leone MD     Referring Provider   PCP: No Ref-Primary, Physician  Referring Physician: Vahid Quinones MD  [address] [place] [address]  Reason for Consultation   Bilateral vocal fold paralysis  Dysphonia  History   HISTORY OF PRESENT ILLNESS: Mr. Guevara is a 67 year old male who presents to us today with dysphonia.      Of note patient has history of sarcoidosis s/p lung biopsy. History of dysphagia followed by GI with several dilations and Heller myotomy.      he presents today for evaluation. 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   - started 3-4 years ago with no inciting incident   - history of achalasia   - had two injections with ENT at [place]  - first one with moderate improvement for 2-4 weeks  - after last one had no improvement   - currently does not talk very much   - gets winded with voice use   - outside ENT who performed injections had concern for breathing issues with further injections     Dysphagia  - eats fully by mouth   - working with nutrition   - had swallow study  - sometimes coughs with liquids   - denies history of pneumonia    Dyspnea  - sometimes has inspiratory stridor when coughing or trying to catch his breath   - snores a lot at night  - not significantly bothersome to him     Throat clearing/cough  - coughs occasionally     GERD/LPRD   - wakes up at night with regurgitation   - also whenever he brushes his teeth or bends over     PAST MEDICAL HISTORY: No past  medical history on file.    PAST SURGICAL HISTORY: No past surgical history on file.    CURRENT MEDICATIONS: No current outpatient medications on file.    ALLERGIES: Patient has no allergy information on record.    SOCIAL HISTORY:    Social History     Socioeconomic History     Marital status:      Spouse name: Not on file     Number of children: Not on file     Years of education: Not on file     Highest education level: Not on file   Occupational History     Not on file   Tobacco Use     Smoking status: Not on file     Smokeless tobacco: Not on file   Substance and Sexual Activity     Alcohol use: Not on file     Drug use: Not on file     Sexual activity: Not on file   Other Topics Concern     Not on file   Social History Narrative     Not on file     Social Determinants of Health     Financial Resource Strain: Not on file   Food Insecurity: Not on file   Transportation Needs: Not on file   Physical Activity: Not on file   Stress: Not on file   Social Connections: Not on file   Intimate Partner Violence: Not on file   Housing Stability: Not on file         FAMILY HISTORY: No family history on file.  Non-contributory for problems with anesthesia    REVIEW OF SYSTEMS:   The patient was asked a 14 point review of systems regarding constitutional symptoms, eye symptoms, ears, nose, mouth, throat symptoms, cardiovascular symptoms, respiratory symptoms, gastrointestinal symptoms, genitourinary symptoms, musculoskeletal symptoms, integumentary symptoms, neurological symptoms, psychiatric symptoms, endocrine symptoms, hematologic/lymphatic symptoms, and allergic/ immunologic symptoms.   The pertinent factors have been included in the HPI and below.  Patient Supplied Answers to Review of Systems       View : No data to display.                Physical Examination   The patient underwent a physical examination as described below. The pertinent positive and negative findings are summarized after the description of the  examination.  Constitutional: The patient's developmental and nutritional status was assessed. The patient's voice quality was assessed.  Head and Face: The head and face were inspected for deformities. The sinuses were palpated. The salivary glands were palpated. Facial muscle strength was assessed bilaterally.  Eyes: Extraocular movements and primary gaze alignment were assessed.  Ears, Nose, Mouth and Throat: The ears and nose were examined for deformities. The nasal septum, mucosa, and turbinates were inspected by anterior rhinoscopy. The lips, teeth, and gums were examined for abnormalities. The oral mucosa, tongue, palate, tonsils, lateral and posterior pharynx were inspected for the presence of asymmetry or mucosal lesions.    Neck: The tracheal position was noted, and the neck mass palpated to determine if there were any asymmetries, abnormal neck masses, thyromegally, or thyroid nodules.  Respiratory: The nature of the breathing and chest expansion/symmetry was observed.  Cardiovascular: The patient was examined to determine the presence of any edema or jugular venous distension.  Abdomen: The contour of the abdomen was noted.  Lymphatic: The patient was examined for infraclavicular lymphadenopathy.  Musculoskeletal: The patient was inspected for the presence of skeletal deformities.  Extremities: The extremities were examined for any clubbing or cyanosis.  Skin: The skin was examined for inflammatory or neoplastic conditions.  Neurologic: The patient's orientation, mood, and affect were noted. The cranial nerve  functions were examined.  Other pertinent positive and negative findings on physical examination:      OC/OP: no lesions, uvula midline, soft palate elevates symmetrically   Neck: no lesions, no TH tenderness to palpation    All other physical examination findings were within normal limits and noncontributory.  Procedures   Flexible laryngoscopy (CPT 76569)      Pre-procedure diagnosis: dysphonia    Post-procedure diagnosis: same as above  Indication for procedure: Mr. Guevara is a 67 year old male with see above  Procedure(s): Fiberoptic Laryngoscopy    Details of Procedure: After informed consent was obtained, the patient was seated in the examination chair.  The areas of the nasopharynx as well as the hypopharynx were anesthetized with topical 4% lidocaine with 0.25% phenylephrine atomizer.  Examination of the base of tongue was performed first.  Attention was directed to any evidence of masses in the area or evidence of leukoplakia or candidal infection.  Attention was directed to the epiglottis where its size and position was determined and its movement on phonation of the vowel  e .  The piriform sinuses were then inspected for any mass lesions or pooling of secretions.  Attention was then directed to the larynx. The vocal folds were inspected for infection or any areas of leukoplakia, for masses, polypoid degeneration, or hemorrhage.  Having done this, the arytenoids and vocal processes were inspected for erythema or evidence of granuloma formation.  The posterior commissure was then inspected for evidence of inflammatory changes in the mucosa and heaping up of mucosal tissue. The patient was then instructed to say the vowel  e .  Adduction of vocal folds to the midline was observed for any evidence of paresis or paralysis of the larynx or asymmetry in rotation of the larynx to the left or right. The patient was asked to breathe and the degree of abduction was noted bilaterally.  Subglottic view of the larynx was obtained for any additional mass lesions or mucosal changes.  Finally the post cricoid was examined for evidence of pooling of secretions, as well as the pharyngeal wall mucosa.   Anesthesia type: 0.25% phenylephrine    Findings:  Anatomic/physiological deviations: RNC, bilateral vocal fold paralysis with severe vocal fold atrophy   Right vocal process: Marked restriction of mobility   Left vocal  process: Marked restriction of mobility  Glottal gap: Glottal gap  Supraglottic structures: Normal  Hypopharynx: Normal     Estimated Blood Loss: minimal  Complications: None  Disposition: Patient tolerated the procedure well                   Fiberoptic Endoscopic Evaluation of Swallowing (CPT 52776)  and Interpretation of Swallowing (CPT 63730)    Indications: See above notes for complete history and physical. Patient complains of dysphagia to liquids and/or there is suggestion on history and endoscopic exam of the presence of dysphagia causing medical complaints.  Swallowing evaluation is being performed to assess the presence and degree of dysphagia, and to recommend a safe diet.     Pulmonary Status:  No PNA   Current Diet:              regular                                        thin liquids      Consistency Amounts:  Thin Liquid: sip   Puree: 1 tsp  Solid: cookies            Positioning: upright in a chair  Oral Peripheral Exam: See physical exam section.  Anatomic Notes: See Videostroboscopy report for assessment of anatomy and laryngeal functioning  Pharyngeal secretions prior to administration of liquid or food: Yes  Oral Phase Abnormal Findings: No abnormal behavior observed  Behavioral Adaptations: No abnormal behavior observed  Pharyngeal Phase Abnormal Findings: no penetration, no aspiration    Recommended Diet:  regular                                        thin liquids              Review of Relevant Clinical Data   I personally reviewed:  Notes:   [facility] 4/3/23      Pulmonology [facility] 3/7/23  Assessment/Plan:   Mr. Guevara is a 67-year-old male who sees me for chronic cough. Patient has had thorough evaluation and treatment for multiple chronic cough conditions including airway disease/asthma, reflux and upper airway cough syndrome without improvement. I feel the patient does likely have sarcoidosis, however, treatment with prednisone failed to resolve cough. Dysphagia, reflux and  vomiting is likely driving symptoms.    Lung nodules, sarcoidosis  -Repeat CT chest in one year  -Stop Symbicort    Procedure note 12/8/22  Indications:  Bandar simon is a 66-year-old gentleman who had a previous Heller myotomy and Tu fundoplication. He had progression of disease, achalasia, and a weakening esophagus. He was having increasing dysphagia and we discussed takedown of the Tu fundoplication. We discussed issues that included, but were not limited to, anesthetic risk, bleeding, infection, heart attack, stroke, death, injury to other organs, postoperative expectations and limitations, reoperation, leaks, etc. We discussed the possibility that he derives no benefit from the operation. He elected to proceed after asking appropriate questions.     Radiology:   The images were reviewed and interpreted of CT neck soft tissue 2/24/23  Shows right vocal fold with CAHA implant     Impression  1. No new adenopathy.   2. A previously noted enlarged partially calcified pretracheal node on the right has decreased slightly in size.   3. Normal glottis with symmetric vocal cords. Since the previous exam, interval postprocedural changes of injection laryngoplasty on the right.   4. Cervical spondylosis. No prevertebral soft tissue swelling          CT chest 2/2/23  IMPRESSION:   1.  Bilateral pulmonary nodules predominantly in the mid and upper lungs including subpleural nodules along the major fissures. The majority of the nodules have increased in size mildly since 03/13/2020. Benign bilateral calcified granulomas.   2.  Partially calcified mild mediastinal and bilateral hilar lymphadenopathy unchanged.   3.  Constellation of findings can be seen with sarcoidosis or previous granulomatous infection.    XR esophagus 1/11/23  Impression:   Esophageal stasis was again noted with tertiary contractions suggesting dysmotility.   No evidence of mass or stricture.   Gastroesophageal reflux was not well evaluated, though is  suspected.      XR chest 22  IMPRESSION:   Heart size and pulmonary vascularity within normal limits. No focal pulmonary infiltrates. Hypertrophic changes thoracolumbar spine with mild thoracolumbar curve.    Xray Video Swallow Exam 10/8/21  IMPRESSION :   No visualized laryngeal penetration or tracheal aspiration.      Procedures:   PFT 23  Conclusion:   Normal pulmonary function tests.      EGD 21  Findings:  Esophagus - Dilated and aperistaltic, consistent with achalasia. No retained food. The EsoFlip 30 mm achalasia dilation balloon was advanced across the GEJ and inflated to 60 ccs, and held in place for 60 seconds.   The balloon was then withdrawn; there was blood on the balloon post-dilation, and there was a small tear across the GEJ, but no bleeding or perforation was noted.  Stomach - normal.  Duodenum - normal  Impressions/Post-Op Diagnosis: Achalasia; treated with the 30 mm EsoFlip balloon    Labs:  No results found for: TSH  No results found for: NA, CO2, BUN, CREAT, GLUCOSE, PHOS  No results found for: WBC, HGB, HCT, MCV, PLT  No results found for: PT, PTT, INR  No results found for: AHSAN  No components found for: RHEUMATOIDFACTOR,  RF  No results found for: CRP  No components found for: CKTOT, URICACID  No components found for: C3, C4, DSDNAAB, NDNAABIFA  No results found for: MPOAB    Patient reported Quality of Life (QOL) Measures   Patient Supplied Answers To VHI Questionnaire       View : No data to display.                  Patient Supplied Answers To EAT Questionnaire       View : No data to display.                  Patient Supplied Answers To CSI Questionnaire       View : No data to display.                  @[email or web address]@    Impression & Plan     IMPRESSION: Mr. Guevara is a 67 year old male who is being seen for the followin. 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   - history of sarcoidosis s/p lung biopsy with subsequent cough  for 3 years   - has received right vocal fold augmentation through [facility] with benefit for about 2 weeks   - now voice is very weak and gets winded with voice use   - scope shows bilateral vocal fold paralysis with severe vocal fold atrophy  - FEES shows no penetration, no aspiration   - discussed etiology of vocal fold paralysis in this case is unclear   - discussed option of diagnostic MDL to evaluate breathing with evaluation of scar band formation and if not consideration of suture lateralization or trach placement  Plan  - obtain records from ENT in [place] to [place] if there has been a progression   - review outside imaging   - will review his case further and he will follow-up virtually in 2 weeks for further discussion of next steps       RETURN VISIT: 2 weeks via virtual visit    Thank you for the kind referral and for allowing me to share in the care of Mr. Guevara. If you have any questions, please do not hesitate to contact me.    Scribe Disclosure:  IKaylin am serving as a scribe to document services personally performed by Keysha Leone MD at this visit, based upon the provider's statements to me. All documentation has been reviewed by the aforementioned provider prior to being entered into the official medical record.     Keysha Leone MD    Laryngology    [facility]  Department of  Otolaryngology - Head and Neck Surgery  Clinics & Surgery Center  [address] [place] [address]  Appointment line: [phone]  Fax: [phone]  [email or web address]

## 2023-05-10 NOTE — PROGRESS NOTES
Speech-Language Pathology Department   EVALUATION  [facility]  Clinical Swallow Evaluation with Endoscopic Guidance by MD    05/09/23 1600   General Information   Type Of Visit Initial   Start Of Care Date 05/08/23   Referring Physician Dr. Keysha Leone   Orders Evaluate And Treat   Orders Comment Clinical Swallow Evaluation   Medical Diagnosis Dysphonia; dysphagia, unspecified   Precautions/limitations No Known Precautions/limitations   Hearing Functional in 1:1 setting   Pertinent History of Current Problem/OT: Additional Occupational Profile Info Bandar Guevara is a 67 year old male significant for sarcoidosis, heart disease, and esophageal achalasia, status post Nissen fundoplication and takedown who presents today for evaluation of voice in the context of idiopathic bilateral vocal fold immobility. He has severe esophageal dysphagia. He underwent [facility]SS at [facility] in 2021, which revealed oropharyngeal swallow mechanism was WFL. He presents today in conjunction with ENT clinic visit per MD request. MD scope reveals severe vocal fold muscle atrophy resulting in glottic gap during phonation. Clinical swallow evaluation requested per MD.   Respiratory Status Room air   Prior Level Of Function Swallowing   Prior Level Of Function Comment Regular textures/thin liquids   General Observations Pt pleasant and cooperative throughout evaluation   Patient/family Goals To see if there are other options about his vocal folds   General Information Comments Pt accompanied by his wife   Clinical Swallow Evaluation   Oral Musculature generally intact   Structural Abnormalities none present   Dentition present and adequate   Mucosal Quality adequate   Mandibular Strength and Mobility intact   Oral Labial Strength and Mobility WFL   Lingual Strength and Mobility WFL   Velar Elevation intact   Buccal Strength and Mobility intact   Laryngeal Function Cough;Swallow;Voicing initiated    Oral Musculature Comments Severe dysphonia characterized by significant breathiness. MD scope reveals bilateral vocal fold paralysis with severe vocal fold atrophy resulting in glottic gap. No pooling of secretions.   Additional Documentation Yes   Clinical Swallow Eval: Thin Liquid Texture Trial   Mode of Presentation, Thin Liquids straw   Volume of Liquid or Food Presented 3oz   Oral Phase of Swallow WFL   Pharyngeal Phase of Swallow intact   Diagnostic Statement PO trials evaluated under endoscopy completed by MD. No penetration/aspiration or significant residuals.   Clinical Swallow Eval: Puree Solid Texture Trial   Mode of Presentation, Puree spoon;fed by clinician   Volume of Puree Presented 3 tablespoons   Oral Phase, Puree WFL   Pharyngeal Phase, Puree intact   Diagnostic Statement PO trials evaluated under endoscopy completed by MD. No penetration/aspiration or significant residuals.   Clinical Swallow Eval: Regular (Solid)   Mode of Presentation self-fed   Volume Presented 1/2 Olga Lidia Doone   Oral Phase WFL   Pharyngeal Phase intact   Diagnostic Statement PO trials evaluated under endoscopy completed by MD. No penetration/aspiration or significant residuals.   Swallow Compensations   Swallow Compensations No compensations were used   Educational Assessment   Barriers to Learning No barriers   Esophageal Phase of Swallow   Patient reports or presents with symptoms of esophageal dysphagia Yes   Esophageal comments Pt has known severe esophageal dysphagia d/t esophageal achalasia   Swallow Eval: Clinical Impressions   Skilled Criteria for Therapy Intervention No problems identified which require skilled intervention   Dysphagia Outcome Severity Scale (WILLA) Level 7 - WILLA   Treatment Diagnosis Safe, functional oropharyngeal swallow response   Diet texture recommendations Regular diet;Thin liquids (level 0)   Recommended Feeding/Eating Techniques alternate between small bites and sips of food/liquid;maintain  upright posture during/after eating for 30 mins;small sips/bites   Demonstrates Need for Referral to Another Service other (see comments)  (voice SLP)   Predicted Duration of Therapy Intervention (days/wks) Evaluation only   Anticipated Discharge Disposition home   Risks and Benefits of Treatment have been explained. Yes   Patient, family and/or staff in agreement with Plan of Care Yes   Clinical Impression Comments Pt presents today with a safe, functional oropharyngeal swallow response. Pt demonstrates severe dysphonia characterized by significant breathiness. MD scope reveals bilateral vocal fold paralysis with severe vocal fold atrophy resulting in glottic gap. No pooling of secretions. Pt assessed today with thin liquid, puree and solid PO trials under endoscopy completed by MD. No penetration/aspiration or significant oropharyngeal residuals. From an oropharyngeal perspective, pt safe to continue regular textures/thin liquids with use of general safe swallow strategies.   Total Session Time   SLP Eval: oral/pharyngeal swallow function, clinical minutes (47167) 15   Total Evaluation Time 15     EBONIE Blanchard MA (music), CCC-SLP   Speech Language Pathologist  BJORN Trained Vocologist   [facility]  Dept. of Otolaryngology  Department of Rehabilitation Services  [address] [place] [id number]  Email: [email or web address]   Phone: [phone]  Pronouns: she/her/hers

## 2023-05-24 ENCOUNTER — TELEPHONE (OUTPATIENT)
Dept: OTOLARYNGOLOGY | Facility: CLINIC | Age: 68
End: 2023-05-24
Payer: COMMERCIAL

## 2023-05-24 NOTE — TELEPHONE ENCOUNTER
Called patient's wife back to confirm their virutal appointment where provider will establish plan of care. Patient's was was agreeable and verbalized understanding of the situation. Vickie Muñoz RN on 5/24/2023 at 1:50 PM

## 2023-05-24 NOTE — TELEPHONE ENCOUNTER
[facility] Call Center    Phone Message    May a detailed message be left on voicemail: yes     Reason for Call: Other: Pt wife Ana called, Pt was supposed to get a call back to discuss treatment options, Please call Ana @ [phone], Thanks     Action Taken: Other: ENT    Travel Screening: Not Applicable

## 2023-05-26 NOTE — PROGRESS NOTES
Bandar is a 67 year old who is being evaluated via a billable video visit.      How would you like to obtain your AVS? MyChart  If the video visit is dropped, the invitation should be resent by: Sent via text  Will anyone else be joining your video visit? No        Video-Visit Details    Type of service:  Video Visit   Video Start Time: 11:39 AM  Video End Time:11:53 AM    Originating Location (pt. Location): Home    Distant Location (provider location):  On-site  Platform used for Video Visit: [facility]         [facility]   at the [facility]   Otolaryngology Clinic     Patient: Bandar Guevara    MRN: [id number]    : 1955    Age/Gender: 67 year old male  Date of Service: 2023  Rendering Provider:   Keysha Leone MD     Chief Complaint   Bilateral vocal fold paralysis  Dysphonia  Interval History   HISTORY OF PRESENT ILLNESS: Mr. Guevara is a 67 year old male is being followed for dysphonia.   he was initially seen on 2023. Please refer to this note for full history.     Of note patient has history of sarcoidosis s/p lung biopsy. History of dysphagia followed by GI with several dilations and Heller myotomy.     Today, he presents via video. he reports   - has not noticed worsening breathing since last visit  - has been hard to get words out  - few episodes of noisy breathing    PAST MEDICAL HISTORY: No past medical history on file.    PAST SURGICAL HISTORY: No past surgical history on file.    CURRENT MEDICATIONS:   Current Outpatient Medications:      acetaminophen (TYLENOL) 500 MG tablet, Take 1,000 mg by mouth, Disp: , Rfl:      aspirin (ASA) 81 MG EC tablet, Take 81 mg by mouth, Disp: , Rfl:      brimonidine (ALPHAGAN-P) 0.15 % ophthalmic solution, Apply 1 drop to eye, Disp: , Rfl:      budesonide-formoterol (SYMBICORT) 160-4.5 MCG/ACT Inhaler, Inhale 2 puffs into the lungs, Disp: , Rfl:      famotidine (PEPCID) 40 MG tablet, , Disp: , Rfl:      losartan (COZAAR) 50 MG tablet, Take 1  tablet by mouth daily, Disp: , Rfl:      metoprolol succinate ER (TOPROL XL) 25 MG 24 hr tablet, Take by mouth every 12 hours, Disp: , Rfl:      omeprazole (PRILOSEC) 20 MG DR capsule, Take 40 mg by mouth, Disp: , Rfl:      ondansetron (ZOFRAN ODT) 4 MG ODT tab, Place 4 mg under the tongue, Disp: , Rfl:      rosuvastatin (CRESTOR) 20 MG tablet, , Disp: , Rfl:      triamcinolone (KENALOG) 0.1 % external cream, Apply a thin layer to the face twice daily for 2 weeks on and 2 weeks off., Disp: , Rfl:     ALLERGIES: Patient has no known allergies.    SOCIAL HISTORY:    Social History     Socioeconomic History     Marital status:      Spouse name: Not on file     Number of children: Not on file     Years of education: Not on file     Highest education level: Not on file   Occupational History     Not on file   Tobacco Use     Smoking status: Not on file     Smokeless tobacco: Not on file   Vaping Use     Vaping status: Not on file   Substance and Sexual Activity     Alcohol use: Not on file     Drug use: Not on file     Sexual activity: Not on file   Other Topics Concern     Not on file   Social History Narrative     Not on file     Social Determinants of Health     Financial Resource Strain: Not on file   Food Insecurity: Not on file   Transportation Needs: Not on file   Physical Activity: Not on file   Stress: Not on file   Social Connections: Not on file   Intimate Partner Violence: Not on file   Housing Stability: Not on file         FAMILY HISTORY: No family history on file.   Non-contributory for problems with anesthesia    REVIEW OF SYSTEMS:   The patient was asked a 14 point review of systems regarding constitutional symptoms, eye symptoms, ears, nose, mouth, throat symptoms, cardiovascular symptoms, respiratory symptoms, gastrointestinal symptoms, genitourinary symptoms, musculoskeletal symptoms, integumentary symptoms, neurological symptoms, psychiatric symptoms, endocrine symptoms, hematologic/lymphatic  symptoms, and allergic/ immunologic symptoms.   The pertinent factors have been included in the HPI and below.  Patient Supplied Answers to Review of Systems       View : No data to display.                Physical Examination   The patient underwent a physical examination as described below. The pertinent positive and negative findings are summarized after the description of the examination.  Constitutional: The patient's developmental and nutritional status was assessed. The patient's voice quality was assessed.  Head and Face: The head and face were inspected for deformities. Facial muscle strength was assessed bilaterally.  Eyes: Extraocular movements and primary gaze alignment were assessed.  Ears, Nose, Mouth and Throat: The ears and nose were examined for deformities.The lips were examined for abnormalities.   Neck: The neck was visualized for abnormal neck masses  Respiratory: The nature of the breathing was observed.  Extremities: The hand extremities were examined for any clubbing or cyanosis.  Skin: The skin was examined for inflammatory or neoplastic conditions.  Neurologic: The patient's orientation, mood, and affect were noted. The cranial nerve  functions were examined.  Other pertinent positive and negative findings on physical examination:   Breathing comfortably on room air, no stridor with deep inspiration  not throat clearing throughout the visit       Review of Relevant Clinical Data   I personally reviewed:  Notes:    Radiology:    Pathology:    Procedures:    Labs:  No results found for: TSH  No results found for: NA, CO2, BUN, CREAT, GLUCOSE, PHOS  No results found for: WBC, HGB, HCT, MCV, PLT  No results found for: PT, PTT, INR  No results found for: AHSAN  No components found for: RHEUMATOIDFACTOR,  RF  No results found for: CRP  No components found for: CKTOT, URICACID  No components found for: C3, C4, DSDNAAB, NDNAABIFA  No results found for: MPOAB    Patient reported Quality of Life (QOL)  Measures   Patient Supplied Answers To VHI Questionnaire       View : No data to display.                  Patient Supplied Answers To EAT Questionnaire       View : No data to display.                  Patient Supplied Answers To CSI Questionnaire       View : No data to display.                  @[email or web address]@     Impression & Plan     IMPRESSION: Mr. Guevara is a 67 year old male who is being seen for the followin. 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   - history of sarcoidosis s/p lung biopsy with subsequent cough for 3 years   - has received right vocal fold augmentation through [facility] with benefit for about 2 weeks   - now voice is very weak and gets winded with voice use   - scope shows bilateral vocal fold paralysis with severe vocal fold atrophy  - FEES shows no penetration, no aspiration   - discussed etiology of vocal fold paralysis in this case is unclear   - discussed option of diagnostic MDL to evaluate breathing with evaluation of scar band formation and if not consideration of suture lateralization or trach placement  - Reviewed CT chest with radiology. There are calcified nodes in mediastinum but do not explain reason for vocal fold paralysis.  - symptoms 2023 are stable  - discussed that vocal folds are too close together for additional filler, discussed options of diagnostic surgery to see how far vocal folds can be moved apart  - discussed if stenosis - if scar band ok to laser, if CA joint ankylosis, then options are a trach, if vocal fold paralysis - then consider vocal fold suture lateralization   Plan  - CT neck to evaluate length of vocal folds  - surgery - diagnostic MDL, if scar band will proceed with removal, discussed awake fiberoptic intubation, discussed post-op swelling and need for trach if difficulty breathing       RETURN VISIT: after surgery    Scribe Disclosure:  I, Jayson Mcmillan, am serving as a scribe to  document services personally performed by Keysha Leone MD based on data collection and the provider's statements to me.     Keysha Leone MD    Laryngology    [facility]  Department of  Otolaryngology - Head and Neck Surgery  [facility] [address] [address]  Appointment line: [phone]  Fax: [phone]    20 minutes spent on the date of the encounter doing chart review, patient visit, documentation and discussion with family

## 2023-05-30 ENCOUNTER — VIRTUAL VISIT (OUTPATIENT)
Dept: OTOLARYNGOLOGY | Facility: CLINIC | Age: 68
End: 2023-05-30
Payer: COMMERCIAL

## 2023-05-30 ENCOUNTER — PREP FOR PROCEDURE (OUTPATIENT)
Dept: OTOLARYNGOLOGY | Facility: CLINIC | Age: 68
End: 2023-05-30

## 2023-05-30 DIAGNOSIS — J38.6 GLOTTIC STENOSIS: Primary | ICD-10-CM

## 2023-05-30 DIAGNOSIS — J38.02 BILATERAL VOCAL FOLD PARALYSIS: Primary | ICD-10-CM

## 2023-05-30 PROCEDURE — 99214 OFFICE O/P EST MOD 30 MIN: CPT | Mod: VID | Performed by: OTOLARYNGOLOGY

## 2023-05-30 RX ORDER — METOPROLOL SUCCINATE 25 MG/1
TABLET, EXTENDED RELEASE ORAL EVERY 12 HOURS
COMMUNITY
Start: 2022-09-09

## 2023-05-30 RX ORDER — LOSARTAN POTASSIUM 50 MG/1
1 TABLET ORAL DAILY
COMMUNITY
Start: 2023-01-09

## 2023-05-30 RX ORDER — FAMOTIDINE 40 MG/1
TABLET, FILM COATED ORAL
COMMUNITY
Start: 2022-11-01 | End: 2023-06-29

## 2023-05-30 RX ORDER — ONDANSETRON 4 MG/1
4 TABLET, ORALLY DISINTEGRATING ORAL EVERY 6 HOURS PRN
COMMUNITY
Start: 2023-04-17

## 2023-05-30 RX ORDER — ACETAMINOPHEN 500 MG
1000 TABLET ORAL EVERY 8 HOURS PRN
COMMUNITY
Start: 2022-03-31

## 2023-05-30 RX ORDER — ROSUVASTATIN CALCIUM 20 MG/1
20 TABLET, COATED ORAL AT BEDTIME
COMMUNITY
Start: 2023-05-05

## 2023-05-30 RX ORDER — BUDESONIDE AND FORMOTEROL FUMARATE DIHYDRATE 160; 4.5 UG/1; UG/1
2 AEROSOL RESPIRATORY (INHALATION) PRN
COMMUNITY
Start: 2022-12-07

## 2023-05-30 RX ORDER — TRIAMCINOLONE ACETONIDE 1 MG/G
CREAM TOPICAL
COMMUNITY
Start: 2023-05-05

## 2023-05-30 RX ORDER — BRIMONIDINE TARTRATE 1.5 MG/ML
1 SOLUTION/ DROPS OPHTHALMIC
COMMUNITY
Start: 2022-08-10

## 2023-05-30 NOTE — LETTER
2023       RE: Bandar Guevara  [address]  [facility] [address]     Dear Colleague,    Thank you for referring your patient, Bandar Guevara, to the [facility]AR NOSE AND THROAT CLINIC [place] [facility]. Please see a copy of my visit note below.    Bandar is a 67 year old who is being evaluated via a billable video visit.      How would you like to obtain your AVS? MyChart  If the video visit is dropped, the invitation should be resent by: Sent via text  Will anyone else be joining your video visit? No        Video-Visit Details    Type of service:  Video Visit   Video Start Time: 11:39 AM  Video End Time:11:53 AM    Originating Location (pt. Location): Home    Distant Location (provider location):  On-site  Platform used for Video Visit: [facility]         [facility]   at the [facility]   Otolaryngology Clinic     Patient: Bandar Guevara    MRN: [id number]    : 1955    Age/Gender: 67 year old male  Date of Service: 2023  Rendering Provider:   Keysha Leone MD     Chief Complaint   Bilateral vocal fold paralysis  Dysphonia  Interval History   HISTORY OF PRESENT ILLNESS: Mr. Guevara is a 67 year old male is being followed for dysphonia.   he was initially seen on 2023. Please refer to this note for full history.     Of note patient has history of sarcoidosis s/p lung biopsy. History of dysphagia followed by GI with several dilations and Heller myotomy.     Today, he presents via video. he reports   - has not noticed worsening breathing since last visit  - has been hard to get words out  - few episodes of noisy breathing    PAST MEDICAL HISTORY: No past medical history on file.    PAST SURGICAL HISTORY: No past surgical history on file.    CURRENT MEDICATIONS:   Current Outpatient Medications:      acetaminophen (TYLENOL) 500 MG tablet, Take 1,000 mg by mouth, Disp: , Rfl:      aspirin (ASA) 81 MG EC tablet, Take 81 mg by  mouth, Disp: , Rfl:      brimonidine (ALPHAGAN-P) 0.15 % ophthalmic solution, Apply 1 drop to eye, Disp: , Rfl:      budesonide-formoterol (SYMBICORT) 160-4.5 MCG/ACT Inhaler, Inhale 2 puffs into the lungs, Disp: , Rfl:      famotidine (PEPCID) 40 MG tablet, , Disp: , Rfl:      losartan (COZAAR) 50 MG tablet, Take 1 tablet by mouth daily, Disp: , Rfl:      metoprolol succinate ER (TOPROL XL) 25 MG 24 hr tablet, Take by mouth every 12 hours, Disp: , Rfl:      omeprazole (PRILOSEC) 20 MG DR capsule, Take 40 mg by mouth, Disp: , Rfl:      ondansetron (ZOFRAN ODT) 4 MG ODT tab, Place 4 mg under the tongue, Disp: , Rfl:      rosuvastatin (CRESTOR) 20 MG tablet, , Disp: , Rfl:      triamcinolone (KENALOG) 0.1 % external cream, Apply a thin layer to the face twice daily for 2 weeks on and 2 weeks off., Disp: , Rfl:     ALLERGIES: Patient has no known allergies.    SOCIAL HISTORY:    Social History     Socioeconomic History     Marital status:      Spouse name: Not on file     Number of children: Not on file     Years of education: Not on file     Highest education level: Not on file   Occupational History     Not on file   Tobacco Use     Smoking status: Not on file     Smokeless tobacco: Not on file   Vaping Use     Vaping status: Not on file   Substance and Sexual Activity     Alcohol use: Not on file     Drug use: Not on file     Sexual activity: Not on file   Other Topics Concern     Not on file   Social History Narrative     Not on file     Social Determinants of Health     Financial Resource Strain: Not on file   Food Insecurity: Not on file   Transportation Needs: Not on file   Physical Activity: Not on file   Stress: Not on file   Social Connections: Not on file   Intimate Partner Violence: Not on file   Housing Stability: Not on file         FAMILY HISTORY: No family history on file.   Non-contributory for problems with anesthesia    REVIEW OF SYSTEMS:   The patient was asked a 14 point review of systems  regarding constitutional symptoms, eye symptoms, ears, nose, mouth, throat symptoms, cardiovascular symptoms, respiratory symptoms, gastrointestinal symptoms, genitourinary symptoms, musculoskeletal symptoms, integumentary symptoms, neurological symptoms, psychiatric symptoms, endocrine symptoms, hematologic/lymphatic symptoms, and allergic/ immunologic symptoms.   The pertinent factors have been included in the HPI and below.  Patient Supplied Answers to Review of Systems       View : No data to display.                Physical Examination   The patient underwent a physical examination as described below. The pertinent positive and negative findings are summarized after the description of the examination.  Constitutional: The patient's developmental and nutritional status was assessed. The patient's voice quality was assessed.  Head and Face: The head and face were inspected for deformities. Facial muscle strength was assessed bilaterally.  Eyes: Extraocular movements and primary gaze alignment were assessed.  Ears, Nose, Mouth and Throat: The ears and nose were examined for deformities.The lips were examined for abnormalities.   Neck: The neck was visualized for abnormal neck masses  Respiratory: The nature of the breathing was observed.  Extremities: The hand extremities were examined for any clubbing or cyanosis.  Skin: The skin was examined for inflammatory or neoplastic conditions.  Neurologic: The patient's orientation, mood, and affect were noted. The cranial nerve  functions were examined.  Other pertinent positive and negative findings on physical examination:   Breathing comfortably on room air, no stridor with deep inspiration  not throat clearing throughout the visit       Review of Relevant Clinical Data   I personally reviewed:  Notes:    Radiology:    Pathology:    Procedures:    Labs:  No results found for: TSH  No results found for: NA, CO2, BUN, CREAT, GLUCOSE, PHOS  No results found for: WBC,  HGB, HCT, MCV, PLT  No results found for: PT, PTT, INR  No results found for: HASAN  No components found for: RHEUMATOIDFACTOR,  RF  No results found for: CRP  No components found for: CKTOT, URICACID  No components found for: C3, C4, DSDNAAB, NDNAABIFA  No results found for: MPOAB    Patient reported Quality of Life (QOL) Measures   Patient Supplied Answers To VHI Questionnaire       View : No data to display.                  Patient Supplied Answers To EAT Questionnaire       View : No data to display.                  Patient Supplied Answers To CSI Questionnaire       View : No data to display.                  @[email or web address]     Impression & Plan     IMPRESSION: Mr. Guevara is a 67 year old male who is being seen for the followin. 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   - history of sarcoidosis s/p lung biopsy with subsequent cough for 3 years   - has received right vocal fold augmentation through [facility] with benefit for about 2 weeks   - now voice is very weak and gets winded with voice use   - scope shows bilateral vocal fold paralysis with severe vocal fold atrophy  - FEES shows no penetration, no aspiration   - discussed etiology of vocal fold paralysis in this case is unclear   - discussed option of diagnostic MDL to evaluate breathing with evaluation of scar band formation and if not consideration of suture lateralization or trach placement  - Reviewed CT chest with radiology. There are calcified nodes in mediastinum but do not explain reason for vocal fold paralysis.  - symptoms 2023 are stable  - discussed that vocal folds are too close together for additional filler, discussed options of diagnostic surgery to see how far vocal folds can be moved apart  - discussed if stenosis - if scar band ok to laser, if CA joint ankylosis, then options are a trach, if vocal fold paralysis - then consider vocal fold suture lateralization   Plan  - CT neck  to evaluate length of vocal folds  - surgery - diagnostic MDL, if scar band will proceed with removal, discussed awake fiberoptic intubation, discussed post-op swelling and need for trach if difficulty breathing       RETURN VISIT: after surgery    Scribe Disclosure:  I, Jayson Mcmillan, am serving as a scribe to document services personally performed by Keysha Leone MD based on data collection and the provider's statements to me.     Keysha Leone MD    Laryngology    [facility]  Department of  Otolaryngology - Head and Neck Surgery  [facility] [place] [address]  Appointment line: [phone]  Fax: [phone]    20 minutes spent on the date of the encounter doing chart review, patient visit, documentation and discussion with family       Again, thank you for allowing me to participate in the care of your patient.      Sincerely,    Keysha Leone MD

## 2023-06-07 ENCOUNTER — TELEPHONE (OUTPATIENT)
Dept: OTOLARYNGOLOGY | Facility: CLINIC | Age: 68
End: 2023-06-07
Payer: COMMERCIAL

## 2023-06-07 DIAGNOSIS — J38.00 VOCAL CORD PARESIS: Primary | ICD-10-CM

## 2023-06-07 NOTE — TELEPHONE ENCOUNTER
Called patient's wife back to clarify CT scan scheduling. Patient will have CT neck done prior to his prodcedure in early July. Also went over pre-op teaching with patient and will mail out the packet tomorrow. Patient will have H&P done prior to procedure. Patient's wife was agreeable and verbalized understanding. Vickie Muñoz RN on 6/7/2023 at 12:50 PM

## 2023-06-07 NOTE — TELEPHONE ENCOUNTER
[facility] Call Center    Phone Message    May a detailed message be left on voicemail: yes     Reason for Call: Other: Pt has not been scheduled for catscan for his surgery.  Pt wife would like a call back to discuss. ph # [phone], Thanks     Action Taken: Other: ENT    Travel Screening: Not Applicable

## 2023-06-10 ENCOUNTER — ANCILLARY PROCEDURE (OUTPATIENT)
Dept: CT IMAGING | Facility: CLINIC | Age: 68
End: 2023-06-10
Attending: OTOLARYNGOLOGY
Payer: COMMERCIAL

## 2023-06-10 LAB
CREAT BLD-MCNC: 0.9 MG/DL (ref 0.7–1.3)
GFR SERPL CREATININE-BSD FRML MDRD: >60 ML/MIN/1.73M2

## 2023-06-10 PROCEDURE — 82565 ASSAY OF CREATININE: CPT | Mod: 90 | Performed by: PATHOLOGY

## 2023-06-10 PROCEDURE — 70491 CT SOFT TISSUE NECK W/DYE: CPT | Mod: GC | Performed by: RADIOLOGY

## 2023-06-10 RX ORDER — IOPAMIDOL 755 MG/ML
90 INJECTION, SOLUTION INTRAVASCULAR ONCE
Status: COMPLETED | OUTPATIENT
Start: 2023-06-10 | End: 2023-06-10

## 2023-06-10 RX ADMIN — IOPAMIDOL 90 ML: 755 INJECTION, SOLUTION INTRAVASCULAR at 11:39

## 2023-06-15 ENCOUNTER — DOCUMENTATION ONLY (OUTPATIENT)
Dept: OTOLARYNGOLOGY | Facility: CLINIC | Age: 68
End: 2023-06-15
Payer: COMMERCIAL

## 2023-06-15 ENCOUNTER — PATIENT OUTREACH (OUTPATIENT)
Dept: OTOLARYNGOLOGY | Facility: CLINIC | Age: 68
End: 2023-06-15
Payer: COMMERCIAL

## 2023-06-15 NOTE — PROGRESS NOTES
Called patient's wife to inquire about time off after procedure. Patient's wife will connect with patient and call back to let us know what day would be best to go back. Vickie Muñoz RN on 6/15/2023 at 11:09 AM

## 2023-06-15 NOTE — PROGRESS NOTES
Patient called to provide verbal consent to allow clinic to send medical leave paperwork to [employer]. Writer explained that with this consent we will send details about his medical diagnosis pertaining to his procedure needed for company to approve paid leave. Patient was agreeable and verbalized understanding of the situation.  Will fax once provider signs. Vickie Muñzo RN on 6/15/2023 at 4:12 PM

## 2023-06-29 RX ORDER — ELECTROLYTES/DEXTROSE
1 SOLUTION, ORAL ORAL DAILY
COMMUNITY

## 2023-06-29 RX ORDER — VITAMIN B COMPLEX
1 TABLET ORAL DAILY
COMMUNITY

## 2023-07-05 ENCOUNTER — HOSPITAL ENCOUNTER (OUTPATIENT)
Facility: CLINIC | Age: 68
Discharge: HOME | End: 2023-07-05
Attending: OTOLARYNGOLOGY | Admitting: OTOLARYNGOLOGY
Payer: COMMERCIAL

## 2023-07-05 ENCOUNTER — ANESTHESIA (OUTPATIENT)
Dept: SURGERY | Facility: CLINIC | Age: 68
End: 2023-07-05
Payer: COMMERCIAL

## 2023-07-05 ENCOUNTER — ANESTHESIA EVENT (OUTPATIENT)
Dept: SURGERY | Facility: CLINIC | Age: 68
End: 2023-07-05
Payer: COMMERCIAL

## 2023-07-05 VITALS
HEART RATE: 67 BPM | WEIGHT: 144.4 LBS | TEMPERATURE: 97.7 F | OXYGEN SATURATION: 100 % | SYSTOLIC BLOOD PRESSURE: 157 MMHG | RESPIRATION RATE: 16 BRPM | DIASTOLIC BLOOD PRESSURE: 98 MMHG | BODY MASS INDEX: 21.39 KG/M2 | HEIGHT: 69 IN

## 2023-07-05 PROCEDURE — 710N000012 HC RECOVERY PHASE 2, PER MINUTE: Performed by: OTOLARYNGOLOGY

## 2023-07-05 PROCEDURE — 360N000077 HC SURGERY LEVEL 4, PER MIN: Performed by: OTOLARYNGOLOGY

## 2023-07-05 PROCEDURE — 258N000003 HC RX IP 258 OP 636: Performed by: NURSE ANESTHETIST, CERTIFIED REGISTERED

## 2023-07-05 PROCEDURE — 250N000009 HC RX 250: Performed by: NURSE ANESTHETIST, CERTIFIED REGISTERED

## 2023-07-05 PROCEDURE — 250N000025 HC SEVOFLURANE, PER MIN: Performed by: OTOLARYNGOLOGY

## 2023-07-05 PROCEDURE — 31526 DX LARYNGOSCOPY W/OPER SCOPE: CPT | Mod: 51 | Performed by: OTOLARYNGOLOGY

## 2023-07-05 PROCEDURE — 250N000009 HC RX 250: Performed by: ANESTHESIOLOGY

## 2023-07-05 PROCEDURE — 250N000011 HC RX IP 250 OP 636: Performed by: NURSE ANESTHETIST, CERTIFIED REGISTERED

## 2023-07-05 PROCEDURE — 710N000010 HC RECOVERY PHASE 1, LEVEL 2, PER MIN: Performed by: OTOLARYNGOLOGY

## 2023-07-05 PROCEDURE — 370N000017 HC ANESTHESIA TECHNICAL FEE, PER MIN: Performed by: OTOLARYNGOLOGY

## 2023-07-05 PROCEDURE — 31622 DX BRONCHOSCOPE/WASH: CPT | Mod: GC | Performed by: OTOLARYNGOLOGY

## 2023-07-05 PROCEDURE — 272N000001 HC OR GENERAL SUPPLY STERILE: Performed by: OTOLARYNGOLOGY

## 2023-07-05 PROCEDURE — 999N000141 HC STATISTIC PRE-PROCEDURE NURSING ASSESSMENT: Performed by: OTOLARYNGOLOGY

## 2023-07-05 RX ORDER — ONDANSETRON 2 MG/ML
4 INJECTION INTRAMUSCULAR; INTRAVENOUS EVERY 30 MIN PRN
Status: DISCONTINUED | OUTPATIENT
Start: 2023-07-05 | End: 2023-07-05 | Stop reason: HOSPADM

## 2023-07-05 RX ORDER — FENTANYL CITRATE 50 UG/ML
50 INJECTION, SOLUTION INTRAMUSCULAR; INTRAVENOUS EVERY 5 MIN PRN
Status: DISCONTINUED | OUTPATIENT
Start: 2023-07-05 | End: 2023-07-05 | Stop reason: HOSPADM

## 2023-07-05 RX ORDER — ONDANSETRON 4 MG/1
4 TABLET, ORALLY DISINTEGRATING ORAL EVERY 30 MIN PRN
Status: DISCONTINUED | OUTPATIENT
Start: 2023-07-05 | End: 2023-07-05 | Stop reason: HOSPADM

## 2023-07-05 RX ORDER — GLYCOPYRROLATE 0.2 MG/ML
INJECTION, SOLUTION INTRAMUSCULAR; INTRAVENOUS PRN
Status: DISCONTINUED | OUTPATIENT
Start: 2023-07-05 | End: 2023-07-05

## 2023-07-05 RX ORDER — LIDOCAINE HYDROCHLORIDE 20 MG/ML
SOLUTION OROPHARYNGEAL PRN
Status: DISCONTINUED | OUTPATIENT
Start: 2023-07-05 | End: 2023-07-05

## 2023-07-05 RX ORDER — LIDOCAINE HYDROCHLORIDE 40 MG/ML
SOLUTION TOPICAL PRN
Status: DISCONTINUED | OUTPATIENT
Start: 2023-07-05 | End: 2023-07-05 | Stop reason: HOSPADM

## 2023-07-05 RX ORDER — PROPOFOL 10 MG/ML
INJECTION, EMULSION INTRAVENOUS CONTINUOUS PRN
Status: DISCONTINUED | OUTPATIENT
Start: 2023-07-05 | End: 2023-07-05

## 2023-07-05 RX ORDER — LIDOCAINE 40 MG/G
CREAM TOPICAL
Status: DISCONTINUED | OUTPATIENT
Start: 2023-07-05 | End: 2023-07-05 | Stop reason: HOSPADM

## 2023-07-05 RX ORDER — SODIUM CHLORIDE, SODIUM LACTATE, POTASSIUM CHLORIDE, CALCIUM CHLORIDE 600; 310; 30; 20 MG/100ML; MG/100ML; MG/100ML; MG/100ML
INJECTION, SOLUTION INTRAVENOUS CONTINUOUS PRN
Status: DISCONTINUED | OUTPATIENT
Start: 2023-07-05 | End: 2023-07-05

## 2023-07-05 RX ORDER — HYDROMORPHONE HCL IN WATER/PF 6 MG/30 ML
0.4 PATIENT CONTROLLED ANALGESIA SYRINGE INTRAVENOUS EVERY 5 MIN PRN
Status: DISCONTINUED | OUTPATIENT
Start: 2023-07-05 | End: 2023-07-05 | Stop reason: HOSPADM

## 2023-07-05 RX ORDER — PROPOFOL 10 MG/ML
INJECTION, EMULSION INTRAVENOUS PRN
Status: DISCONTINUED | OUTPATIENT
Start: 2023-07-05 | End: 2023-07-05

## 2023-07-05 RX ORDER — DEXAMETHASONE SODIUM PHOSPHATE 4 MG/ML
INJECTION, SOLUTION INTRA-ARTICULAR; INTRALESIONAL; INTRAMUSCULAR; INTRAVENOUS; SOFT TISSUE PRN
Status: DISCONTINUED | OUTPATIENT
Start: 2023-07-05 | End: 2023-07-05

## 2023-07-05 RX ORDER — FENTANYL CITRATE 50 UG/ML
25 INJECTION, SOLUTION INTRAMUSCULAR; INTRAVENOUS EVERY 5 MIN PRN
Status: DISCONTINUED | OUTPATIENT
Start: 2023-07-05 | End: 2023-07-05 | Stop reason: HOSPADM

## 2023-07-05 RX ORDER — OXYMETAZOLINE HYDROCHLORIDE 0.05 G/100ML
SPRAY NASAL PRN
Status: DISCONTINUED | OUTPATIENT
Start: 2023-07-05 | End: 2023-07-05 | Stop reason: HOSPADM

## 2023-07-05 RX ORDER — ONDANSETRON 2 MG/ML
INJECTION INTRAMUSCULAR; INTRAVENOUS PRN
Status: DISCONTINUED | OUTPATIENT
Start: 2023-07-05 | End: 2023-07-05

## 2023-07-05 RX ORDER — LIDOCAINE HYDROCHLORIDE 20 MG/ML
INJECTION, SOLUTION INFILTRATION; PERINEURAL PRN
Status: DISCONTINUED | OUTPATIENT
Start: 2023-07-05 | End: 2023-07-05

## 2023-07-05 RX ORDER — SODIUM CHLORIDE, SODIUM LACTATE, POTASSIUM CHLORIDE, CALCIUM CHLORIDE 600; 310; 30; 20 MG/100ML; MG/100ML; MG/100ML; MG/100ML
INJECTION, SOLUTION INTRAVENOUS CONTINUOUS
Status: DISCONTINUED | OUTPATIENT
Start: 2023-07-05 | End: 2023-07-05 | Stop reason: HOSPADM

## 2023-07-05 RX ORDER — HYDROMORPHONE HCL IN WATER/PF 6 MG/30 ML
0.2 PATIENT CONTROLLED ANALGESIA SYRINGE INTRAVENOUS EVERY 5 MIN PRN
Status: DISCONTINUED | OUTPATIENT
Start: 2023-07-05 | End: 2023-07-05 | Stop reason: HOSPADM

## 2023-07-05 RX ADMIN — Medication 10 MG: at 12:44

## 2023-07-05 RX ADMIN — DEXAMETHASONE SODIUM PHOSPHATE 10 MG: 4 INJECTION, SOLUTION INTRA-ARTICULAR; INTRALESIONAL; INTRAMUSCULAR; INTRAVENOUS; SOFT TISSUE at 12:55

## 2023-07-05 RX ADMIN — GLYCOPYRROLATE 0.1 MG: 0.2 INJECTION, SOLUTION INTRAMUSCULAR; INTRAVENOUS at 12:28

## 2023-07-05 RX ADMIN — LIDOCAINE HYDROCHLORIDE 0.5 ML: 10 INJECTION, SOLUTION EPIDURAL; INFILTRATION; INTRACAUDAL; PERINEURAL at 11:06

## 2023-07-05 RX ADMIN — SODIUM CHLORIDE, POTASSIUM CHLORIDE, SODIUM LACTATE AND CALCIUM CHLORIDE: 600; 310; 30; 20 INJECTION, SOLUTION INTRAVENOUS at 12:25

## 2023-07-05 RX ADMIN — Medication 10 MG: at 12:45

## 2023-07-05 RX ADMIN — Medication 10 MG: at 12:54

## 2023-07-05 RX ADMIN — LIDOCAINE HYDROCHLORIDE 5 ML: 1 POWDER at 12:33

## 2023-07-05 RX ADMIN — PROPOFOL 80 MG: 10 INJECTION, EMULSION INTRAVENOUS at 12:42

## 2023-07-05 RX ADMIN — PROPOFOL 120 MCG/KG/MIN: 10 INJECTION, EMULSION INTRAVENOUS at 12:42

## 2023-07-05 RX ADMIN — ONDANSETRON 4 MG: 2 INJECTION INTRAMUSCULAR; INTRAVENOUS at 13:01

## 2023-07-05 RX ADMIN — LIDOCAINE HYDROCHLORIDE 60 MG: 20 INJECTION, SOLUTION INFILTRATION; PERINEURAL at 12:42

## 2023-07-05 RX ADMIN — SUGAMMADEX 200 MG: 100 INJECTION, SOLUTION INTRAVENOUS at 13:01

## 2023-07-05 ASSESSMENT — ACTIVITIES OF DAILY LIVING (ADL)
ADLS_ACUITY_SCORE: 35

## 2023-07-05 NOTE — ANESTHESIA PROCEDURE NOTES
Airway         Procedure Start/Stop Times: 7/5/2023 12:42 PM  Staff -        Anesthesiologist:  Reinier Hills MD       CRNA: Demond Geller APRN CRNA       Performed By: CRNAIndications and Patient Condition       Indications for airway management: airway protection       Induction type:intravenous       Mask difficulty assessment: 0 - not attempted    Final Airway Details       Final airway type: endotracheal airway       Successful airway: ETT - single and Laser  Endotracheal Airway Details        ETT size (mm): 5.0       Successful intubation technique: flexible bronchoscopy       Grade View of Cords: 1       Position: Left       Measured from: lips       Secured at (cm): 21       Bite block used: None    Post intubation assessment        Placement verified by: capnometry        Number of attempts at approach: 1       Number of other approaches attempted: 1 (fiberoptic driven by Dr. Leone)       Secured with: silk tape       Ease of procedure: easy       Dentition: Intact    Medication(s) Administered   Medication Administration Time: 7/5/2023 12:42 PM

## 2023-07-05 NOTE — OP NOTE
Operative Note   Otolaryngology - Head and Neck Surgery       DATE OF OPERATION:   July 5, 2023    PREOPERATIVE DIAGNOSIS:   Bilateral vocal fold paralysis  Dysphonia     POSTOPERATIVE DIAGNOSIS:   Same    NAME OF OPERATION:   1. Fiberoptic awake intubatino  2. Microdirect suspension laryngoscopy      ANESTHESIA  Type: General   ETT: 5.0 laser tenax     SURGEON:   Keysha Leone MD    RESIDENT SURGEON(S):   Anca Colunga MD    INDICATIONS FOR PROCEDURE:   The patient is a 67 year old male with history of bilateral vocal fold paralysis. The patient comes in for diagnostic surery. Risks, benefits and alternatives were discussed. The patient wishes to proceed with surgery and has signed an informed consent.     FINDINGS:   1. Patient had minimal airway opening therefore fiberoptic awake intubation was done by myself with 1.7 flex bronch and 5.0 laser tenax  2. ossoff exposure  3. Right CA joint fixed, left CA joint mobile, no posterior glottic scar band     DESCRIPTION OF PROCEDURE:   The patient was brought into the operating room and placed supine on the operating room table. A time-out was performed. Then the patient was intubated fiberoptically with a 1.7 flex bronch and 5.0 laser tenax ett    Then the patient was turned 90 degrees from the anesthesiologist. The head was drapped in the usual fashion. Then the dentition and mucosa were inspected prior to start. A reinforced tooth guard was placed on the upper dentition. The ossoff laryngoscope was carefully introduced into the oral cavity and gently passed to the oropharynx. Here the larynx was exposed and the patient was placed in suspension.     This revealed right CA joint fixed, left CA joint mobile. Photodocumentation was performed.       This was the end of our procedure. Afrin soaked pledgets were applied to the surgical site for hemostasis.  The surgical site was then inspected and no residual bleeding was seen. The patient was taken out of suspension. The  instrumentation was carefully removed, examining the mucosa and dentition on the way it. The dental guard was removed, and the mucosa and dentition were seen to be in their preoperative state at the conclusion of the procedure. The patient was then handed back to the care of anesthesia who awoke and extubated the patient without complication.    COMPLICATIONS:   None.  .   ESTIMATED BLOOD LOSS:   Less than 10cc    DISPOSITION:   PACU.    SPECIMENS:  * No specimens in log *       PHOTODOCUMENTATION:

## 2023-07-05 NOTE — PROGRESS NOTES
Called patient's responsible adult his wife Ana ([phone])  who is available by phone and needs a 30 minute notice to pick patient up after discharged.

## 2023-07-05 NOTE — ANESTHESIA CARE TRANSFER NOTE
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    Anesthesia Type:   General     Note:    Oropharynx: oral airway in place  Level of Consciousness: drowsy  Oxygen Supplementation: nasal cannula  Level of Supplemental Oxygen (L/min / FiO2): 4  Independent Airway: airway patency satisfactory and stable    Vital Signs Stable: post-procedure vital signs reviewed and stable    Patient transferred to: PACU    Handoff Report: Identifed the Patient, Identified the Reponsible Provider, Reviewed the pertinent medical history, Discussed the surgical course, Reviewed Intra-OP anesthesia mangement and issues during anesthesia, Set expectations for post-procedure period and Allowed opportunity for questions and acknowledgement of understanding      Vitals:  Vitals Value Taken Time   /82 07/05/23 1321   Temp 36.2  C (97.1  F) 07/05/23 1321   Pulse 64 07/05/23 1326   Resp 16 07/05/23 1326   SpO2 100 % 07/05/23 1326   Vitals shown include unvalidated device data.    Electronically Signed By: NILESH Mendez CRNA  July 5, 2023  1:27 PM

## 2023-07-05 NOTE — ANESTHESIA POSTPROCEDURE EVALUATION
Patient: Bandar Guevara    Procedure: Procedure(s):  MICROLARYNGOSCOPY, awake fiberoptic intubation, CO2 laser on standby  Bronchoscopy flexible       Anesthesia Type:  General    Note:  Disposition: Outpatient   Postop Pain Control: Uneventful            Sign Out: Well controlled pain   PONV: No   Neuro/Psych: Uneventful            Sign Out: Acceptable/Baseline neuro status   Airway/Respiratory: Uneventful            Sign Out: Acceptable/Baseline resp. status   CV/Hemodynamics: Uneventful            Sign Out: Acceptable CV status; No obvious hypovolemia; No obvious fluid overload   Other NRE: NONE   DID A NON-ROUTINE EVENT OCCUR? No           Last vitals:  Vitals Value Taken Time   /87 07/05/23 1345   Temp 36.2  C (97.1  F) 07/05/23 1321   Pulse 67 07/05/23 1347   Resp 17 07/05/23 1347   SpO2 99 % 07/05/23 1347   Vitals shown include unvalidated device data.    Electronically Signed By: Reinier Hills MD  July 5, 2023  1:49 PM

## 2023-07-05 NOTE — ANESTHESIA PREPROCEDURE EVALUATION
Anesthesia Pre-Procedure Evaluation    Patient: Bandar Guevara   MRN: [id number] : 1955        Procedure : Procedure(s):  MICROLARYNGOSCOPY, awake fiberoptic intubation, possible CO2 laser resection of stenosis, possible balloon dilation  possible steroid injection  possible Bronchoscopy flexible          Past Medical History:   Diagnosis Date     Achalasia      Coronary artery disease      Dysphagia      Hypertension      Sarcoidosis      Stented coronary artery       Past Surgical History:   Procedure Laterality Date     ABDOMEN SURGERY      heller myotomy     coronary artery stent placement N/A      ENDOBRONCHIAL ULTRASOUND       ESOPHAGOSCOPY       ORTHOPEDIC SURGERY        Allergies   Allergen Reactions     Augmentin [Amoxicillin-Pot Clavulanate] GI Disturbance     Clavulanic Acid      Sulfa Antibiotics Swelling     Blocked airway      Social History     Tobacco Use     Smoking status: Never     Smokeless tobacco: Never   Substance Use Topics     Alcohol use: Not Currently      Wt Readings from Last 1 Encounters:   No data found for Wt        Anesthesia Evaluation   Pt has had prior anesthetic. Type: General.        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     History of sarcoidosis s/p lung biopsy with subsequent cough for 3 years       Neurologic:       Cardiovascular:     (+) hypertension--CAD ---    METS/Exercise Tolerance:     Hematologic:       Musculoskeletal:       GI/Hepatic: Comment: Esophageal achalasia, status post Nissen fundoplication and takedown   Dysphagia followed by GI with several dilations and Heller myotomy        Renal/Genitourinary:       Endo:       Psychiatric/Substance Use:       Infectious Disease:       Malignancy:       Other:            Physical Exam    Airway        Mallampati: II   TM distance: > 3 FB   Neck ROM: full   Mouth opening: > 3 cm    Respiratory Devices and Support         Dental       (+)  Modest Abnormalities - crowns, retainers, 1 or 2 missing teeth    B=Bridge, C=Chipped, L=Loose, M=Missing    Cardiovascular   cardiovascular exam normal          Pulmonary           (+) decreased breath sounds           OUTSIDE LABS:  CBC: No results found for: WBC, HGB, HCT, PLT  BMP:   Lab Results   Component Value Date    CR 0.9 06/10/2023     COAGS: No results found for: PTT, INR, FIBR  POC: No results found for: BGM, HCG, HCGS  HEPATIC: No results found for: ALBUMIN, PROTTOTAL, ALT, AST, GGT, ALKPHOS, BILITOTAL, BILIDIRECT, STAN  OTHER: No results found for: PH, LACT, A1C, LIBIA, PHOS, MAG, LIPASE, AMYLASE, TSH, T4, T3, CRP, SED    Anesthesia Plan    ASA Status:  3   NPO Status:  NPO Appropriate    Anesthesia Type: General.   Induction: Intravenous.   Maintenance: TIVA.        Consents    Anesthesia Plan(s) and associated risks, benefits, and realistic alternatives discussed. Questions answered and patient/representative(s) expressed understanding.    - Discussed:     - Discussed with:  Patient         Postoperative Care    Pain management: IV analgesics, Multi-modal analgesia.   PONV prophylaxis: Ondansetron (or other 5HT-3), Background Propofol Infusion, Dexamethasone or Solumedrol     Comments:                Reinier Hills MD

## 2023-07-05 NOTE — DISCHARGE INSTRUCTIONS
[facility]   Adult Discharge Orders & Instructions     For 24 hours after surgery    Get plenty of rest.  A responsible adult must stay with you for at least 24 hours after you leave the hospital.   Do not drive or use heavy equipment.  If you have weakness or tingling, don't drive or use heavy equipment until this feeling goes away.  Do not drink alcohol.  Avoid strenuous or risky activities.  Ask for help when climbing stairs.   You may feel lightheaded.  IF so, sit for a few minutes before standing.  Have someone help you get up.   If you have nausea (feel sick to your stomach): Drink only clear liquids such as apple juice, ginger ale, broth or 7-Up.  Rest may also help.  Be sure to drink enough fluids.  Move to a regular diet as you feel able.  You may have a slight fever. Call the doctor if your fever is over 100 F (37.7 C) (taken under the tongue) or lasts longer than 24 hours.  You may have a dry mouth, a sore throat, muscle aches or trouble sleeping.  These should go away after 24 hours.  Do not make important or legal decisions.   Call your doctor for any of the followin.  Signs of infection (fever, growing tenderness at the surgery site, a large amount of drainage or bleeding, severe pain, foul-smelling drainage, redness, swelling).    2. It has been over 8 to 10 hours since surgery and you are still not able to urinate (pass water).    3.  Headache for over 24 hours.    To contact a doctor, call Dr. Leone at Otolaryngology/ENT clinic @ [phone]

## 2023-07-05 NOTE — BRIEF OP NOTE
[facility]    Brief Operative Note    Pre-operative diagnosis: Glottic stenosis [J38.6]  Post-operative diagnosis Same as pre-operative diagnosis    Procedure: Procedure(s):  MICROLARYNGOSCOPY, awake fiberoptic intubation, possible CO2 laser resection of stenosis, possible balloon dilation  possible steroid injection  possible Bronchoscopy flexible  Surgeon: Surgeon(s) and Role:     * Keysha Leone MD - Primary     * Anca Colunga MD - Resident - Assisting  Anesthesia: General   Estimated Blood Loss: None    Drains: None  Specimens: * No specimens in log *  Findings:   Awake fiberoptic intubation. After DL, it was confirmed patient would be intubatable from above. Mobile left cord, right CA joint with fixation. No posterior glottic banding or stenosis. No subglottic stenosis. .  Complications: None.  Implants: * No implants in log *

## 2023-07-31 ENCOUNTER — VIRTUAL VISIT (OUTPATIENT)
Dept: OTOLARYNGOLOGY | Facility: CLINIC | Age: 68
End: 2023-07-31
Payer: COMMERCIAL

## 2023-07-31 DIAGNOSIS — R49.0 DYSPHONIA: Primary | ICD-10-CM

## 2023-07-31 DIAGNOSIS — J38.02 BILATERAL VOCAL FOLD PARALYSIS: ICD-10-CM

## 2023-07-31 PROCEDURE — 92507 TX SP LANG VOICE COMM INDIV: CPT | Mod: GN | Performed by: SPEECH-LANGUAGE PATHOLOGIST

## 2023-07-31 NOTE — PROGRESS NOTES
Bandar Guevara is a 67 year old male who is being evaluated via a billable video visit.      Bandar has been notified and verbally consented to the following:     This video visit will be conducted between you and your provider.    Patient has opted to conduct today's video visit vs an in-person appointment.     Video visits are billed at different rates depending on your insurance coverage. Please reach out to your insurance provider with any questions.     If during the course of the call the provider feels the appointment is not appropriate, you will not be charged for this service.  Provider has received verbal consent for billable virtual visit from the patient? Yes  Will anyone else be joining your video visit? Patient's wife    Call initiated at: 3:30 PM   Type of Visit Platform Used: [employer] Video  Location of provider: Home  Location of patient: [facility]  Jin Cummings Jr., M.D., F.A.C.S.  Brittanie Rabago M.D., M.P.H.  Keysha Leone M.D.  Viola Gonzales, Ph.D., CCC-SLP  Luigi Barber, Ph.D., [facility]-SLP  Ronit Camp M.M. (voice), M.A., CCC-SLP  Radha Hernandez M.S., CCC-SLP  VILLA Moran.S., CCC-SLP  DB Cruz (voice), M.S., CCC-SLP    [facility]  VOICE/SPEECH/BREATHING THERAPY PROGRESS REPORT    Patient: Bandar Guevara  Date of Service: 7/31/2023    Date of Last Service: 5/9/23  Referring physician: Dr. Leone  Initial evaluation: 5/9/23  Therapy Session #1     I had the pleasure of seeing Mr. Guevara today, for speech therapy to address a diagnosis of:  Shortness Of Breath (R06.02)   Vocal Fold Paralysis - Bilateral (J38.02)    PROGRESS SINCE LAST SESSION  Mr. Guevara was seen for evaluation on 5/9/23.  At that time, it was determined that he would benefit from a brief course of speech therapy to address respiratory mechanics in the presence of his bilateral vocal fold immobility.  Since then, he states he has not had any change in his symptoms, which is expected, as no  "therapeutic suggestions were made at the time.    Mr. Guevara states that:    There is no difference; he is still aphonic  o His lack of voice is the biggest problem for him    He feels his breathing is doing     Mr. Guevara presents today with the following:    No apparent difficult with breathing; no noise on inspiration  Voice quality:    Aphonic; whisper is mildly forced/effortful    THERAPEUTIC ACTIVITIES  Today Mr. Guevara participated in the following therapeutic activities:    Asked many questions about the nature of his symptoms, and I answered all of these thoroughly.  o I provided explanation of the trade-off between voice and airway, and why Dr. Leone cannot proceed with any medical intervention to improve his voice without first determining that it will not put his airway at greater risk  o I provided more explanation of why he will undergo laryngeal EMG in several weeks    Zurich techniques for optimal breathing technique, with emphasis on techniques to maximize vocal fold abduction and glottic airway  o He demonstrated good respiratory mechanics, with good abdominal and ribcage expansion for inhalation  o Worked on oral/nasal maneuvers to maximize abduction: inhalation through straws of two diameters, slow nasal inhalation, and rapid puppy sniffs  o Worked on a technique of rapid successions of blowing out (\"[id number]\") to abduct the vocal folds on exhalation  o Concentrated on silent inhalation, especially for the puppy sniffs  o Slow nasal inhalation seems to give him the best sense of an improved airway  - He will confirm this in his next laryngeal exam with Dr. Leone.    Zurich concepts of an optimal regimen for practice.  o he should use an interval schedule of practice, with brief periods of practice frequently throughout each day  o Zurich concepts of volitional practice to facilitate motor learning.  o He will keep these techniques in mind throughout the day  o He understands that it will be " important to optimize his airway in order to consider any procedure to improve his voice    I provided an after visit summary by email to help facilitate practice (see Wrap-Up)    IMPRESSIONS/GOALS/PLAN  Mr. Guevara had a productive session of speech therapy today, to address the following:  Dysphonia (R49.0)   Vocal Fold Paralysis - Bilateral (J38.02)   Speech therapy for him is medically necessary to allow  him to meet personal and professional demands and fully engage in activities of daily living.     He will work on his exercises on a daily basis, and work on incorporating the techniques into his daily activities.    Plan: I will see Mr. Guevara as Dr. Leone deems appropriate    Next clinic appointment is not scheduled, but important for SLP to join.    TOTAL SERVICE TIME: 55 minutes  TREATMENT ([id number])      Viola Gonzales, Ph.D., [facility]-SLP  Speech-Language Pathologist  Director, [facility] Voice [facility]  She/her/hers  [phone]

## 2023-08-01 NOTE — PATIENT INSTRUCTIONS
"Isaias Portillo and Bailey,It was nice meeting you both yesterday.Here are the breathing strategies we discussed at your session:  5-10  times per day, do several repetitions of each of these maneuvers:1) inhale through each of the straws; imagine the air going to your belly button2) do several short puppy sniffs; nose noise is OK, but no throat noise!3) inhale through your nose as if smelling roses; a deep silent belly breath4) blow out for several short repetitions of \"[id number]\" and let the  air come back in naturallyWith all of these, make sure your abdominal and lower rib cage areas expand  for inhalation, and \"deflate\" for exhalation.  Does let your upper chest or neck get involved with inhalation.  If the puppy sniffs continue to be noisy when you inhale, discontinue them.    Best wishes.  I'll be  eager to hear the outcome of your laryngeal EMG, and your follow-up with Dr. Leone.      "

## 2023-08-14 ENCOUNTER — DOCUMENTATION ONLY (OUTPATIENT)
Dept: OTOLARYNGOLOGY | Facility: CLINIC | Age: 68
End: 2023-08-14

## 2023-08-14 ENCOUNTER — PREP FOR PROCEDURE (OUTPATIENT)
Dept: OTOLARYNGOLOGY | Facility: CLINIC | Age: 68
End: 2023-08-14

## 2023-08-14 ENCOUNTER — TELEPHONE (OUTPATIENT)
Dept: OTOLARYNGOLOGY | Facility: CLINIC | Age: 68
End: 2023-08-14

## 2023-08-14 ENCOUNTER — OFFICE VISIT (OUTPATIENT)
Dept: NEUROLOGY | Facility: CLINIC | Age: 68
End: 2023-08-14
Payer: COMMERCIAL

## 2023-08-14 DIAGNOSIS — J38.02 BILATERAL VOCAL FOLD PARALYSIS: Primary | ICD-10-CM

## 2023-08-14 DIAGNOSIS — R49.0 DYSPHONIA: Primary | ICD-10-CM

## 2023-08-14 PROCEDURE — 99214 OFFICE O/P EST MOD 30 MIN: CPT | Performed by: OTOLARYNGOLOGY

## 2023-08-14 PROCEDURE — 95868 NDL EMG CRANIAL NRV MUSC BI: CPT | Performed by: PSYCHIATRY & NEUROLOGY

## 2023-08-14 RX ORDER — CEFAZOLIN SODIUM 2 G/50ML
2 SOLUTION INTRAVENOUS SEE ADMIN INSTRUCTIONS
Status: CANCELLED | OUTPATIENT
Start: 2023-08-14

## 2023-08-14 RX ORDER — CEFAZOLIN SODIUM 2 G/50ML
2 SOLUTION INTRAVENOUS
Status: CANCELLED | OUTPATIENT
Start: 2023-08-14

## 2023-08-14 NOTE — PROGRESS NOTES
[facility]   at the [facility]   Otolaryngology Clinic     Patient: Bandar Guevara    MRN: [id number]    : 1955    Age/Gender: 67 year old male  Date of Service: 2023  Rendering Provider:   Keysha Leone MD     Chief Complaint   Bilateral vocal fold paralysis  Dysphonia  Interval History   HISTORY OF PRESENT ILLNESS: HISTORY OF PRESENT ILLNESS: Mr. Guevara is a 67 year old male is being followed for dysphonia.   he was initially seen on 2023. Please refer to this note for full history.      Of note patient has history of sarcoidosis s/p lung biopsy. History of dysphagia followed by GI with several dilations and Heller myotomy.      Today, he presents for follow up. he reports:  - still voice difficulty   - here with his wife     PAST MEDICAL HISTORY:   Past Medical History:   Diagnosis Date    Achalasia     Coronary artery disease     Dysphagia     Hypertension     Sarcoidosis     Stented coronary artery        PAST SURGICAL HISTORY:   Past Surgical History:   Procedure Laterality Date    ABDOMEN SURGERY      heller myotomy    BRONCHOSCOPY FLEXIBLE N/A 2023    Procedure: Bronchoscopy flexible;  Surgeon: Keysha Leone MD;  Location: [facility] OR    coronary artery stent placement N/A     ENDOBRONCHIAL ULTRASOUND      ESOPHAGOSCOPY      LASER CO2 LARYNGOSCOPY N/A 2023    Procedure: MICROLARYNGOSCOPY, awake fiberoptic intubation, CO2 laser on standby;  Surgeon: Keysha Leone MD;  Location: [facility] OR    ORTHOPEDIC SURGERY         CURRENT MEDICATIONS:   Current Outpatient Medications:     acetaminophen (TYLENOL) 500 MG tablet, Take 1,000 mg by mouth every 8 hours as needed, Disp: , Rfl:     aspirin (ASA) 81 MG EC tablet, Take 81 mg by mouth daily, Disp: , Rfl:     brimonidine (ALPHAGAN-P) 0.15 % ophthalmic solution, Apply 1 drop to eye, Disp: , Rfl:     budesonide-formoterol (SYMBICORT) 160-4.5 MCG/ACT Inhaler, Inhale 2 puffs into the lungs as needed, Disp: , Rfl:     losartan (COZAAR)  50 MG tablet, Take 1 tablet by mouth daily, Disp: , Rfl:     metoprolol succinate ER (TOPROL XL) 25 MG 24 hr tablet, Take by mouth every 12 hours, Disp: , Rfl:     Multiple Vitamin (MULTIVITAMIN ADULT) TABS, Take 1 tablet by mouth daily, Disp: , Rfl:     omeprazole (PRILOSEC) 20 MG DR capsule, Take 20 mg by mouth 2 times daily, Disp: , Rfl:     ondansetron (ZOFRAN ODT) 4 MG ODT tab, Take 4 mg by mouth every 6 hours as needed, Disp: , Rfl:     rosuvastatin (CRESTOR) 20 MG tablet, Take 20 mg by mouth At Bedtime, Disp: , Rfl:     triamcinolone (KENALOG) 0.1 % external cream, Apply a thin layer to the face twice daily for 2 weeks on and 2 weeks off., Disp: , Rfl:     Vitamin D3 (VITAMIN D, CHOLECALCIFEROL,) 25 mcg (1000 units) tablet, Take 1 tablet by mouth daily, Disp: , Rfl:     ALLERGIES: Augmentin [amoxicillin-pot clavulanate], Clavulanic acid, and Sulfa antibiotics    SOCIAL HISTORY:    Social History     Socioeconomic History    Marital status:      Spouse name: Not on file    Number of children: Not on file    Years of education: Not on file    Highest education level: Not on file   Occupational History    Not on file   Tobacco Use    Smoking status: Never    Smokeless tobacco: Never   Vaping Use    Vaping Use: Never used   Substance and Sexual Activity    Alcohol use: Not Currently    Drug use: Not Currently    Sexual activity: Not on file   Other Topics Concern    Not on file   Social History Narrative    Not on file     Social Determinants of Health     Financial Resource Strain: Not on file   Food Insecurity: Not on file   Transportation Needs: Not on file   Physical Activity: Not on file   Stress: Not on file   Social Connections: Not on file   Intimate Partner Violence: Not on file   Housing Stability: Not on file         FAMILY HISTORY: No family history on file.   Non-contributory for problems with anesthesia    REVIEW OF SYSTEMS:   The patient was asked a 14 point review of systems regarding  constitutional symptoms, eye symptoms, ears, nose, mouth, throat symptoms, cardiovascular symptoms, respiratory symptoms, gastrointestinal symptoms, genitourinary symptoms, musculoskeletal symptoms, integumentary symptoms, neurological symptoms, psychiatric symptoms, endocrine symptoms, hematologic/lymphatic symptoms, and allergic/ immunologic symptoms.   The pertinent factors have been included in the HPI and below.  Patient Supplied Answers to Review of Systems       No data to display                Physical Examination   The patient underwent a physical examination as described below. The pertinent positive and negative findings are summarized after the description of the examination.  Constitutional: The patient's developmental and nutritional status was assessed. The patient's voice quality was assessed.  Head and Face: The head and face were inspected for deformities. The sinuses were palpated. The salivary glands were palpated. Facial muscle strength was assessed bilaterally.  Eyes: Extraocular movements and primary gaze alignment were assessed.  Ears, Nose, Mouth and Throat: The ears and nose were examined for deformities. The nasal septum, mucosa, and turbinates were inspected by anterior rhinoscopy. The lips, teeth, and gums were examined for abnormalities. The oral mucosa, tongue, palate, tonsils, lateral and posterior pharynx were inspected for the presence of asymmetry or mucosal lesions.    Neck: The tracheal position was noted, and the neck mass palpated to determine if there were any asymmetries, abnormal neck masses, thyromegally, or thyroid nodules.  Respiratory: The nature of the breathing and chest expansion/symmetry was observed.  Cardiovascular: The patient was examined to determine the presence of any edema or jugular venous distension.  Abdomen: The contour of the abdomen was noted.  Lymphatic: The patient was examined for infraclavicular lymphadenopathy.  Musculoskeletal: The patient was  inspected for the presence of skeletal deformities.  Extremities: The extremities were examined for any clubbing or cyanosis.  Skin: The skin was examined for inflammatory or neoplastic conditions.  Neurologic: The patient's orientation, mood, and affect were noted. The cranial nerve  functions were examined.  Other pertinent positive and negative findings on physical examination:   OC/OP: no lesions, uvula midline, soft palate elevates symmetrically   Neck: no lesions, no TH tenderness to palpation     All other physical examination findings were within normal limits and noncontributory.       Review of Relevant Clinical Data   I personally reviewed:   CT neck 6/7/23  caha in the right vocal fold     Labs:  No results found for: TSH  No results found for: NA, CO2, BUN, CREAT, GLUCOSE, PHOS  No results found for: WBC, HGB, HCT, MCV, PLT  No results found for: PT, PTT, INR  No results found for: AHSAN  No components found for: RHEUMATOIDFACTOR,  RF  No results found for: CRP  No components found for: CKTOT, URICACID  No components found for: C3, C4, DSDNAAB, NDNAABIFA  No results found for: MPOAB    Patient reported Quality of Life (QOL) Measures   Patient Supplied Answers To VHI Questionnaire       No data to display                  Patient Supplied Answers To EAT Questionnaire       No data to display                  Patient Supplied Answers To CSI Questionnaire       No data to display                  Patient Supplied Answers to Dyspnea Index Questionnaire:       No data to display                Impression & Plan     IMPRESSION: Mr. Guevara is a 67 year old male who is being seen for the following:      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   - history of sarcoidosis s/p lung biopsy with subsequent cough for 3 years   - has received right vocal fold augmentation through [facility] with benefit for about 2 weeks   - now voice is very weak and gets winded with voice  use   - scope shows bilateral vocal fold paralysis with severe vocal fold atrophy  - FEES shows no penetration, no aspiration   - discussed etiology of vocal fold paralysis in this case is unclear   - discussed option of diagnostic MDL to evaluate breathing with evaluation of scar band formation and if not consideration of suture lateralization or trach placement  - Reviewed CT chest with radiology. There are calcified nodes in mediastinum but do not explain reason for vocal fold paralysis.  - symptoms 5/30/2023 are stable  - discussed that vocal folds are too close together for additional filler, discussed options of diagnostic surgery to see how far vocal folds can be moved apart  - discussed if stenosis - if scar band ok to laser, if CA joint ankylosis, then options are a trach, if vocal fold paralysis - then consider vocal fold suture lateralization   - CT neck - no lesions - he still has CAHA in the right vocal fold  - diagnostic MDL with finding of left CA joint mobile, right CA joint immobile, no glottic scar band posteriorly 7/5/23  - symptoms 8/14/2023 are not improved, still difficulty with voicing  Patient seen today for laryngeal EMG  The left CT and TA showed fibrillation potentials and denervation changes  Feels that voice is improved now - likely due to some of the swelling from the needle  Discussed options of observation, amplifier or attempt at suture lateralization on the left with trial of augmentation after. Discussed risk of trach given his limited airway  Patient would like to proceed with intervention. He is using amplifier without much help.  Plan  - schedule surgery  - 1 -2 week follow up  - can do H&P update    RETURN VISIT: after surgery    Keysha Leone MD    Laryngology    [facility] Voice [facility]  Department of  Otolaryngology - Head and Neck Surgery  [facility]  [address] [place] [id number]  Appointment line: [phone]  Fax:  [phone]  [email or web address]

## 2023-08-14 NOTE — PROGRESS NOTES
[facility]  Electrodiagnostic Laboratory                 Department of Neurology                                                                                                         Test Date:  2023    Patient: Bandar Guevara : 1955 Physician: Cruz Bhat MD   Sex: Male AGE: 67 year Ref Phys:  Dr. Keysha Leone   ID#: [id number]   Technician: Kristy Behling     History and Examination:  67 year old with dysphonia for 6-12 months. This study is performed to assess for focal injury to laryngeal muscles.     Techniques:  EMG was done with a concentric needle electrode. Needle EMG placement was performed by Dr. Keysha Leone. Dr. Cruz Bhat interpreted EMG findings      Results:  Needle EMG of selected laryngeal muscles was performed as tabulated below. Fibrillation potentials and positive sharp waves were seen in the left TA and CT muscles. In the left TA muscle the MUPs appeared slightly long in duration, but it is difficult to make his determination with certainty. Rapidly firing MUPs with reduced recruitment were seen in the left CT and TA muscles.     Interpretation:  This is an abnormal study. 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 Although a few long duration motor unit potentials were seen in the left cricothyroid muscle, no unequivocal evidence of reinnervation (repair) was appreciated on this study.      Cruz Bhat MD  Department of Neurology             Electromyography     Side Muscle Ins Act Fibs/PSW Fasc HF Amp Dur Poly Recrt Int Pat   Left Cricothyroid Incr 2+ Nml 0 Nml 1+ 0 ModRed Nml   Left Thyroarytenoid Incr 2+ Nml 0 Nml Nml 0 Arielle Nml   Right Cricothyroid Nml None Nml 0 Nml Nml 0 Nml Nml

## 2023-08-14 NOTE — LETTER
2023       RE: Bandar Guevara  [address]  [facility] [address]       Dear Colleague,    Thank you for referring your patient, Bandar Guevara, to the [facility] EMG [facility] [place] [facility]. Please see a copy of my visit note below.                            [facility]  Electrodiagnostic Laboratory                 Department of Neurology                                                                                                         Test Date:  2023    Patient: Bandar Guevara : 1955 Physician: Cruz Bhat MD   Sex: Male AGE: 67 year Ref Phys:  Dr. Keysha Leone   ID#: [id number]   Technician: Kristy Behling     History and Examination:  67 year old with dysphonia for 6-12 months. This study is performed to assess for focal injury to laryngeal muscles.     Techniques:  EMG was done with a concentric needle electrode. Needle EMG placement was performed by Dr. Keysha Leone. Dr. Cruz Bhat interpreted EMG findings      Results:  Needle EMG of selected laryngeal muscles was performed as tabulated below. Fibrillation potentials and positive sharp waves were seen in the left TA and CT muscles. In the left TA muscle the MUPs appeared slightly long in duration, but it is difficult to make his determination with certainty. Rapidly firing MUPs with reduced recruitment were seen in the left CT and TA muscles.     Interpretation:  This is an abnormal study. 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 Although a few long duration motor unit potentials were seen in the left cricothyroid muscle, no unequivocal evidence of reinnervation (repair) was appreciated on this study.      Cruz Bhat MD  Department of Neurology             Electromyography     Side Muscle Ins Act Fibs/PSW Fasc HF Amp Dur Poly Recrt Int Pat   Left Cricothyroid Incr 2+ Nml 0 Nml 1+ 0  ModRed Nml   Left Thyroarytenoid Incr 2+ Nml 0 Nml Nml 0 Arielle Nml   Right Cricothyroid Nml None Nml 0 Nml Nml 0 Nml Nml             Again, thank you for allowing me to participate in the care of your patient.      Sincerely,    Cruz Bhat MD

## 2023-08-14 NOTE — TELEPHONE ENCOUNTER
Patient was contacted & scheduled surgery with Dr. Leone on 9/13    Surgery is located at [facility]    Patient will be seen for their H&P by:    PCP Dr. Vazquez within 30 days of surgery - Patient confirmed their PCP on file is up to date    Anesthesia type: General      Requested Imaging required for surgery: NA    Patient needs scheduled for their 1-2 week post op    Patient will receive their packet via mail per their preference    Additional comments: GILLIAN Maloney on 8/14/2023 at 1:58 PM

## 2023-09-07 ENCOUNTER — TELEPHONE (OUTPATIENT)
Dept: OTOLARYNGOLOGY | Facility: CLINIC | Age: 68
End: 2023-09-07
Payer: COMMERCIAL

## 2023-09-07 NOTE — TELEPHONE ENCOUNTER
[facility] Call Center    Phone Message    May a detailed message be left on voicemail: yes     Reason for Call: Other: Wife called to let provider know that they have for FMLA - short term leaves forms that are being faxed and need to be filled out and returned to the number listed in the documents      She would like a call when these are returned to [facility]?    Action Taken: Other: ENT    Travel Screening: Not Applicable

## 2023-09-07 NOTE — TELEPHONE ENCOUNTER
Patients wife called in to get surgery time and address. Provided address and advised her that a pre op nurse should be giving her a call tomorrow or Monday with [facility] exact start time and pre op instructions. Ana understood and had no further questions    Lakeshia Maloney, Perioperative Coordinator 9/7/2023 at 4:12 PM     English

## 2023-09-08 NOTE — TELEPHONE ENCOUNTER
Called patient's wife and LVM that we have not gotten [facility] paperwork for the patient. Provided patient with otolaryngology direct fax number, and writer's direct number for call back. Vickie Muñoz RN on 9/8/2023 at 9:42 AM

## 2023-09-13 ENCOUNTER — ANESTHESIA EVENT (OUTPATIENT)
Dept: SURGERY | Facility: CLINIC | Age: 68
End: 2023-09-13
Payer: COMMERCIAL

## 2023-09-13 ENCOUNTER — ANESTHESIA (OUTPATIENT)
Dept: SURGERY | Facility: CLINIC | Age: 68
End: 2023-09-13
Payer: COMMERCIAL

## 2023-09-13 ENCOUNTER — HOSPITAL ENCOUNTER (OUTPATIENT)
Facility: CLINIC | Age: 68
Discharge: HOME | End: 2023-09-13
Attending: OTOLARYNGOLOGY | Admitting: OTOLARYNGOLOGY
Payer: COMMERCIAL

## 2023-09-13 VITALS
TEMPERATURE: 98 F | OXYGEN SATURATION: 99 % | BODY MASS INDEX: 21.62 KG/M2 | RESPIRATION RATE: 16 BRPM | WEIGHT: 142.64 LBS | SYSTOLIC BLOOD PRESSURE: 147 MMHG | HEIGHT: 68 IN | DIASTOLIC BLOOD PRESSURE: 91 MMHG | HEART RATE: 79 BPM

## 2023-09-13 DIAGNOSIS — J38.00 VOCAL CORD PARALYSIS: Primary | ICD-10-CM

## 2023-09-13 PROCEDURE — 272N000001 HC OR GENERAL SUPPLY STERILE: Performed by: OTOLARYNGOLOGY

## 2023-09-13 PROCEDURE — 250N000011 HC RX IP 250 OP 636: Performed by: NURSE ANESTHETIST, CERTIFIED REGISTERED

## 2023-09-13 PROCEDURE — 250N000011 HC RX IP 250 OP 636: Performed by: OTOLARYNGOLOGY

## 2023-09-13 PROCEDURE — 258N000003 HC RX IP 258 OP 636: Performed by: ANESTHESIOLOGY

## 2023-09-13 PROCEDURE — 250N000009 HC RX 250: Performed by: OTOLARYNGOLOGY

## 2023-09-13 PROCEDURE — 250N000009 HC RX 250: Performed by: NURSE ANESTHETIST, CERTIFIED REGISTERED

## 2023-09-13 PROCEDURE — 999N000141 HC STATISTIC PRE-PROCEDURE NURSING ASSESSMENT: Performed by: OTOLARYNGOLOGY

## 2023-09-13 PROCEDURE — 250N000009 HC RX 250: Performed by: ANESTHESIOLOGY

## 2023-09-13 PROCEDURE — 710N000012 HC RECOVERY PHASE 2, PER MINUTE: Performed by: OTOLARYNGOLOGY

## 2023-09-13 PROCEDURE — 250N000013 HC RX MED GY IP 250 OP 250 PS 637: Performed by: ANESTHESIOLOGY

## 2023-09-13 PROCEDURE — 370N000017 HC ANESTHESIA TECHNICAL FEE, PER MIN: Performed by: OTOLARYNGOLOGY

## 2023-09-13 PROCEDURE — 31599 UNLISTED PROCEDURE LARYNX: CPT | Mod: 22 | Performed by: OTOLARYNGOLOGY

## 2023-09-13 PROCEDURE — 360N000076 HC SURGERY LEVEL 3, PER MIN: Performed by: OTOLARYNGOLOGY

## 2023-09-13 PROCEDURE — 710N000010 HC RECOVERY PHASE 1, LEVEL 2, PER MIN: Performed by: OTOLARYNGOLOGY

## 2023-09-13 PROCEDURE — 31526 DX LARYNGOSCOPY W/OPER SCOPE: CPT | Mod: 22 | Performed by: OTOLARYNGOLOGY

## 2023-09-13 PROCEDURE — 258N000003 HC RX IP 258 OP 636: Performed by: NURSE ANESTHETIST, CERTIFIED REGISTERED

## 2023-09-13 PROCEDURE — 250N000011 HC RX IP 250 OP 636: Mod: JZ | Performed by: ANESTHESIOLOGY

## 2023-09-13 RX ORDER — LIDOCAINE HYDROCHLORIDE 20 MG/ML
INJECTION, SOLUTION INFILTRATION; PERINEURAL PRN
Status: DISCONTINUED | OUTPATIENT
Start: 2023-09-13 | End: 2023-09-13

## 2023-09-13 RX ORDER — SODIUM CHLORIDE, SODIUM LACTATE, POTASSIUM CHLORIDE, CALCIUM CHLORIDE 600; 310; 30; 20 MG/100ML; MG/100ML; MG/100ML; MG/100ML
INJECTION, SOLUTION INTRAVENOUS CONTINUOUS
Status: DISCONTINUED | OUTPATIENT
Start: 2023-09-13 | End: 2023-09-13 | Stop reason: HOSPADM

## 2023-09-13 RX ORDER — OXYCODONE HYDROCHLORIDE 5 MG/1
5 TABLET ORAL
Status: COMPLETED | OUTPATIENT
Start: 2023-09-13 | End: 2023-09-13

## 2023-09-13 RX ORDER — OXYMETAZOLINE HYDROCHLORIDE 0.05 G/100ML
SPRAY NASAL PRN
Status: DISCONTINUED | OUTPATIENT
Start: 2023-09-13 | End: 2023-09-13 | Stop reason: HOSPADM

## 2023-09-13 RX ORDER — PROPOFOL 10 MG/ML
INJECTION, EMULSION INTRAVENOUS PRN
Status: DISCONTINUED | OUTPATIENT
Start: 2023-09-13 | End: 2023-09-13

## 2023-09-13 RX ORDER — LIDOCAINE 40 MG/G
CREAM TOPICAL
Status: DISCONTINUED | OUTPATIENT
Start: 2023-09-13 | End: 2023-09-13 | Stop reason: HOSPADM

## 2023-09-13 RX ORDER — DEXAMETHASONE SODIUM PHOSPHATE 4 MG/ML
INJECTION, SOLUTION INTRA-ARTICULAR; INTRALESIONAL; INTRAMUSCULAR; INTRAVENOUS; SOFT TISSUE PRN
Status: DISCONTINUED | OUTPATIENT
Start: 2023-09-13 | End: 2023-09-13

## 2023-09-13 RX ORDER — HYDROMORPHONE HCL IN WATER/PF 6 MG/30 ML
0.2 PATIENT CONTROLLED ANALGESIA SYRINGE INTRAVENOUS EVERY 5 MIN PRN
Status: DISCONTINUED | OUTPATIENT
Start: 2023-09-13 | End: 2023-09-13 | Stop reason: HOSPADM

## 2023-09-13 RX ORDER — LIDOCAINE HYDROCHLORIDE AND EPINEPHRINE 10; 10 MG/ML; UG/ML
INJECTION, SOLUTION INFILTRATION; PERINEURAL PRN
Status: DISCONTINUED | OUTPATIENT
Start: 2023-09-13 | End: 2023-09-13 | Stop reason: HOSPADM

## 2023-09-13 RX ORDER — CEFAZOLIN SODIUM/WATER 2 G/20 ML
2 SYRINGE (ML) INTRAVENOUS SEE ADMIN INSTRUCTIONS
Status: DISCONTINUED | OUTPATIENT
Start: 2023-09-13 | End: 2023-09-13 | Stop reason: HOSPADM

## 2023-09-13 RX ORDER — CEFAZOLIN SODIUM/WATER 2 G/20 ML
2 SYRINGE (ML) INTRAVENOUS
Status: COMPLETED | OUTPATIENT
Start: 2023-09-13 | End: 2023-09-13

## 2023-09-13 RX ORDER — FENTANYL CITRATE 50 UG/ML
INJECTION, SOLUTION INTRAMUSCULAR; INTRAVENOUS PRN
Status: DISCONTINUED | OUTPATIENT
Start: 2023-09-13 | End: 2023-09-13

## 2023-09-13 RX ORDER — OXYCODONE HYDROCHLORIDE 5 MG/1
5 TABLET ORAL EVERY 6 HOURS PRN
Qty: 3 TABLET | Refills: 0 | Status: SHIPPED | OUTPATIENT
Start: 2023-09-13 | End: 2023-09-14

## 2023-09-13 RX ORDER — LIDOCAINE HYDROCHLORIDE 40 MG/ML
SOLUTION TOPICAL PRN
Status: DISCONTINUED | OUTPATIENT
Start: 2023-09-13 | End: 2023-09-13 | Stop reason: HOSPADM

## 2023-09-13 RX ORDER — ONDANSETRON 4 MG/1
4 TABLET, ORALLY DISINTEGRATING ORAL EVERY 30 MIN PRN
Status: DISCONTINUED | OUTPATIENT
Start: 2023-09-13 | End: 2023-09-13 | Stop reason: HOSPADM

## 2023-09-13 RX ORDER — FENTANYL CITRATE 50 UG/ML
50 INJECTION, SOLUTION INTRAMUSCULAR; INTRAVENOUS EVERY 5 MIN PRN
Status: DISCONTINUED | OUTPATIENT
Start: 2023-09-13 | End: 2023-09-13 | Stop reason: HOSPADM

## 2023-09-13 RX ORDER — CLINDAMYCIN HCL 300 MG
300 CAPSULE ORAL 4 TIMES DAILY
Qty: 28 CAPSULE | Refills: 0 | Status: SHIPPED | OUTPATIENT
Start: 2023-09-13 | End: 2023-09-20

## 2023-09-13 RX ORDER — ONDANSETRON 2 MG/ML
4 INJECTION INTRAMUSCULAR; INTRAVENOUS EVERY 30 MIN PRN
Status: DISCONTINUED | OUTPATIENT
Start: 2023-09-13 | End: 2023-09-13 | Stop reason: HOSPADM

## 2023-09-13 RX ORDER — HYDROMORPHONE HCL IN WATER/PF 6 MG/30 ML
0.4 PATIENT CONTROLLED ANALGESIA SYRINGE INTRAVENOUS EVERY 5 MIN PRN
Status: DISCONTINUED | OUTPATIENT
Start: 2023-09-13 | End: 2023-09-13 | Stop reason: HOSPADM

## 2023-09-13 RX ORDER — CITRIC ACID/SODIUM CITRATE 334-500MG
30 SOLUTION, ORAL ORAL ONCE
Status: COMPLETED | OUTPATIENT
Start: 2023-09-13 | End: 2023-09-13

## 2023-09-13 RX ORDER — OXYCODONE HYDROCHLORIDE 10 MG/1
10 TABLET ORAL
Status: DISCONTINUED | OUTPATIENT
Start: 2023-09-13 | End: 2023-09-13 | Stop reason: HOSPADM

## 2023-09-13 RX ORDER — FENTANYL CITRATE 50 UG/ML
25 INJECTION, SOLUTION INTRAMUSCULAR; INTRAVENOUS EVERY 5 MIN PRN
Status: DISCONTINUED | OUTPATIENT
Start: 2023-09-13 | End: 2023-09-13 | Stop reason: HOSPADM

## 2023-09-13 RX ORDER — ONDANSETRON 2 MG/ML
INJECTION INTRAMUSCULAR; INTRAVENOUS PRN
Status: DISCONTINUED | OUTPATIENT
Start: 2023-09-13 | End: 2023-09-13

## 2023-09-13 RX ADMIN — FENTANYL CITRATE 100 MCG: 50 INJECTION, SOLUTION INTRAMUSCULAR; INTRAVENOUS at 09:58

## 2023-09-13 RX ADMIN — SODIUM CHLORIDE, POTASSIUM CHLORIDE, SODIUM LACTATE AND CALCIUM CHLORIDE: 600; 310; 30; 20 INJECTION, SOLUTION INTRAVENOUS at 11:28

## 2023-09-13 RX ADMIN — HYDROMORPHONE HYDROCHLORIDE 0.5 MG: 1 INJECTION, SOLUTION INTRAMUSCULAR; INTRAVENOUS; SUBCUTANEOUS at 11:21

## 2023-09-13 RX ADMIN — OXYCODONE HYDROCHLORIDE 5 MG: 5 TABLET ORAL at 14:18

## 2023-09-13 RX ADMIN — PHENYLEPHRINE HYDROCHLORIDE 200 MCG: 10 INJECTION INTRAVENOUS at 10:46

## 2023-09-13 RX ADMIN — PHENYLEPHRINE HYDROCHLORIDE 200 MCG: 10 INJECTION INTRAVENOUS at 10:04

## 2023-09-13 RX ADMIN — LIDOCAINE HYDROCHLORIDE 80 MG: 20 INJECTION, SOLUTION INFILTRATION; PERINEURAL at 09:57

## 2023-09-13 RX ADMIN — Medication 2 G: at 10:06

## 2023-09-13 RX ADMIN — ONDANSETRON 4 MG: 2 INJECTION INTRAMUSCULAR; INTRAVENOUS at 10:12

## 2023-09-13 RX ADMIN — PHENYLEPHRINE HYDROCHLORIDE 100 MCG: 10 INJECTION INTRAVENOUS at 10:27

## 2023-09-13 RX ADMIN — Medication 20 MG: at 10:56

## 2023-09-13 RX ADMIN — SODIUM CITRATE AND CITRIC ACID MONOHYDRATE 30 ML: 500; 334 SOLUTION ORAL at 09:39

## 2023-09-13 RX ADMIN — PHENYLEPHRINE HYDROCHLORIDE 100 MCG: 10 INJECTION INTRAVENOUS at 10:30

## 2023-09-13 RX ADMIN — Medication 50 MG: at 09:59

## 2023-09-13 RX ADMIN — SODIUM CHLORIDE, POTASSIUM CHLORIDE, SODIUM LACTATE AND CALCIUM CHLORIDE: 600; 310; 30; 20 INJECTION, SOLUTION INTRAVENOUS at 09:51

## 2023-09-13 RX ADMIN — PROPOFOL 200 MCG/KG/MIN: 10 INJECTION, EMULSION INTRAVENOUS at 10:06

## 2023-09-13 RX ADMIN — PROPOFOL 170 MG: 10 INJECTION, EMULSION INTRAVENOUS at 09:58

## 2023-09-13 RX ADMIN — SUGAMMADEX 200 MG: 100 INJECTION, SOLUTION INTRAVENOUS at 11:36

## 2023-09-13 RX ADMIN — DEXAMETHASONE SODIUM PHOSPHATE 10 MG: 4 INJECTION, SOLUTION INTRA-ARTICULAR; INTRALESIONAL; INTRAMUSCULAR; INTRAVENOUS; SOFT TISSUE at 09:51

## 2023-09-13 RX ADMIN — Medication 10 MG: at 11:12

## 2023-09-13 ASSESSMENT — ACTIVITIES OF DAILY LIVING (ADL)
ADLS_ACUITY_SCORE: 37
ADLS_ACUITY_SCORE: 35
ADLS_ACUITY_SCORE: 37
ADLS_ACUITY_SCORE: 35

## 2023-09-13 NOTE — PROGRESS NOTES
Temp:  [98.1  F (36.7  C)] 98.1  F (36.7  C)  Pulse:  [75] 75  Resp:  [20] 20  BP: (132-157)/(86-91) 132/86  SpO2:  [100 %] 100 %    Time of notification:Dr. Dennison  Provider notified:[id number]  Patient status:no current labs or EKG  Orders received: none needed for this case    Will continue to monitor.

## 2023-09-13 NOTE — OP NOTE
Operative Note   Otolaryngology - Head and Neck Surgery       DATE OF OPERATION:   September 13, 2023    PREOPERATIVE DIAGNOSIS:   Dyspnea  Dysphonia  Bilateral vocal fold paralysis     POSTOPERATIVE DIAGNOSIS:   Same    NAME OF OPERATION:   Left suture lateralization  Microlaryngoscopy     ANESTHESIA  Type: General  ETT: 5.0 elvin MLT    SURGEON:   Keysha Leone MD    RESIDENT SURGEON(S):   Sara Sánchez MD    INDICATIONS FOR PROCEDURE:   The patient is a 67 year old male with history of dyspnea and dyphonia due to bilateral vocal fold paralysis. The patient comes in for surgery. Risks, benefits and alternatives were discussed. The patient wishes to proceed with surgery and has signed an informed consent.     COMPLEXITY  This surgery was more complex than normally because of performing the surgery with calcified thyroid cartilage. The approach therefore was more difficult technically than normally.  Significantly more time was required to perform all parts of the operation, especially more time was needed with multiple attempts to pass the needle through the thyroid cartilage.     FINDINGS:   1. Exposure with dedo  2. 18gauge without angiocath through the thyroid cartilage, difficult to pass  3. Good suture lateralization on the left at the end      DESCRIPTION OF PROCEDURE:   The patient was brought into the operating room. Laid sitting up on the table. Then he was fiberoptically intubated. After confirmation of end tidal CO2 anesthesia was induced. Then head was prepped and drapped in the usual fashion. A shoulder roll was placed to allow extension of the neck. Then a 3cm incision was outlined in a natural skin crease overlying the cricothyroid membrane. Then the neck was infiltrated with 1% lidocaine with 1:100,000 epinephrine.     Then the dentition and mucosa were inspected prior to start. A reinforced tooth guard was placed on the upper dentition. The dedo laryngoscope was carefully introduced into the oral  cavity and gently passed to the oropharynx. Here the larynx was exposed and the patient was placed in suspension.     This revealed a mobile CA joint on the left. Photodocumentation was performed.     Then a 15 blade was used to make an incision through the skin down to the platysma. Then the midline raphe was identified and the left thyroid ala was exposed. This was rotated into place and the inferior border of the thyroid cartilage was outline. Then with direct visualization through the Dedo, a 18gauge angiocath was passed underneath the vocal fold posteriorly and 18gauge angiocath was passed over the superior vocal fold. Then  2.0 prolene stitch was passed through the inferior angiocath out through the mouth, then the other end was passed through the superior angiocath and out through the mouth. Here a knot was tied and this was brought out through the inferior angiocath. Initially it was passed through the superior angiocath and this broke the suture. So the needle had to be passed again superiorly. Then this was sinched down and the angiocaths removed and the knot tied on the thyroid ala.     This was the end of our procedure. Photodocumentation was performed.      Afrin soaked pledgets were applied to the surgical site for hemostasis.  The surgical site was then inspected and no residual bleeding was seen. The patient was taken out of suspension. The instrumentation was carefully removed, examining the mucosa and dentition on the way it. The dental guard was removed, and the mucosa and dentition were seen to be in their preoperative state at the conclusion of the procedure.     The wound bed was irrigated copiously. No air bubbles were seen. Then, the strap muscles were reapproximated with 3.0 vicryl sutures. The platysma muscle was reapproximated with 4.0 vicryl sutures. The skin edges were repproximated around the penrose with 5.0 subcutaneous monocryl suture. The skin edges were further covered with  mastosol and steri strips.    This was the end of procedure. The patient was turned back to the care of anesthesia who awoke and extubated the patient without complications.     COMPLICATIONS:   None.  .   ESTIMATED BLOOD LOSS:   Less than 10cc    DISPOSITION:   PACU.    SPECIMENS:  * No specimens in log *    PHOTODOCUMENTATION:

## 2023-09-13 NOTE — ANESTHESIA PROCEDURE NOTES
Airway       Patient location during procedure: OR       Procedure Start/Stop Times: 9/13/2023 10:00 AM  Staff -        Other Anesthesia Staff: Lc García       Performed By: SRNA  Consent for Airway        Urgency: elective  Indications and Patient Condition       Indications for airway management: albertina-procedural       Induction type:intravenous       Mask difficulty assessment: 2 - vent by mask + OA or adjuvant +/- NMBA    Final Airway Details       Final airway type: endotracheal airway       Successful airway: ETT - single (elvin tube)  Endotracheal Airway Details        ETT size (mm): 5.0       Cuffed: yes       Successful intubation technique: video laryngoscopy       Grade View of Cords: 1       Adjucts: stylet       Position: Left       Measured from: gums/teeth       Bite block used: None    Post intubation assessment        Placement verified by: capnometry, equal breath sounds and chest rise        Number of attempts at approach: 1       Number of other approaches attempted: 0       Secured with: silk tape       Ease of procedure: easy       Dentition: Unchanged and Intact    Medication(s) Administered   Medication Administration Time: 9/13/2023 10:00 AM

## 2023-09-13 NOTE — ANESTHESIA PREPROCEDURE EVALUATION
Anesthesia Pre-Procedure Evaluation    Patient: Bandar Guevara   MRN: [id number] : 1955        Procedure : Procedure(s):  MICROLARYNGOSCOPY, awake fiberoptic intubation, possible CO2 laser resection of stenosis, possible balloon dilation  possible steroid injection  possible Bronchoscopy flexible          Past Medical History:   Diagnosis Date    Achalasia     Coronary artery disease     Dysphagia     Hypertension     Sarcoidosis     Stented coronary artery       Past Surgical History:   Procedure Laterality Date    ABDOMEN SURGERY      heller myotomy    BRONCHOSCOPY FLEXIBLE N/A 2023    Procedure: Bronchoscopy flexible;  Surgeon: Keysha Leone MD;  Location: [facility] OR    coronary artery stent placement N/A     ENDOBRONCHIAL ULTRASOUND      ESOPHAGOSCOPY      LASER CO2 LARYNGOSCOPY N/A 2023    Procedure: MICROLARYNGOSCOPY, awake fiberoptic intubation, CO2 laser on standby;  Surgeon: Keysha Leone MD;  Location: [facility] OR    ORTHOPEDIC SURGERY        Allergies   Allergen Reactions    Augmentin [Amoxicillin-Pot Clavulanate] GI Disturbance    Clavulanic Acid     Sulfa Antibiotics Swelling     Blocked airway      Social History     Tobacco Use    Smoking status: Never    Smokeless tobacco: Never   Substance Use Topics    Alcohol use: Not Currently      Wt Readings from Last 1 Encounters:   23 64.7 kg (142 lb 10.2 oz)        Anesthesia Evaluation   Pt has had prior anesthetic. Type: General.        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     History of sarcoidosis s/p lung biopsy with subsequent cough for 3 years       Neurologic:       Cardiovascular:     (+)  hypertension- -  CAD -  - -                                      METS/Exercise Tolerance:     Hematologic:       Musculoskeletal:       GI/Hepatic: Comment: Esophageal achalasia, status post Nissen fundoplication and takedown   Dysphagia followed by GI with several dilations and  Heller myotomy        Renal/Genitourinary:       Endo:       Psychiatric/Substance Use:       Infectious Disease:       Malignancy:       Other:            Physical Exam    Airway        Mallampati: II   TM distance: > 3 FB   Neck ROM: full   Mouth opening: > 3 cm    Respiratory Devices and Support         Dental       (+) Modest Abnormalities - crowns, retainers, 1 or 2 missing teeth    B=Bridge, C=Chipped, L=Loose, M=Missing    Cardiovascular   cardiovascular exam normal       Rhythm and rate: regular and normal     Pulmonary           (+) decreased breath sounds           OUTSIDE LABS:  CBC: No results found for: WBC, HGB, HCT, PLT  BMP:   Lab Results   Component Value Date    CR 0.9 06/10/2023     COAGS: No results found for: PTT, INR, FIBR  POC: No results found for: BGM, HCG, HCGS  HEPATIC: No results found for: ALBUMIN, PROTTOTAL, ALT, AST, GGT, ALKPHOS, BILITOTAL, BILIDIRECT, STAN  OTHER: No results found for: PH, LACT, A1C, LIBIA, PHOS, MAG, LIPASE, AMYLASE, TSH, T4, T3, CRP, SED    Anesthesia Plan    ASA Status:  3    NPO Status:  NPO Appropriate    Anesthesia Type: General.     - Airway: ETT   Induction: Intravenous.   Maintenance: TIVA.   Techniques and Equipment:     - Airway: Video-Laryngoscope     - Lines/Monitors: 2nd IV     Consents    Anesthesia Plan(s) and associated risks, benefits, and realistic alternatives discussed. Questions answered and patient/representative(s) expressed understanding.     - Discussed: Risks, Benefits and Alternatives for the PROCEDURE were discussed     - Discussed with:  Patient      - Extended Intubation/Ventilatory Support Discussed: No.      - Patient is DNR/DNI Status: No     Use of blood products discussed: No .     Postoperative Care    Pain management: IV analgesics, Multi-modal analgesia.   PONV prophylaxis: Ondansetron (or other 5HT-3), Background Propofol Infusion, Dexamethasone or Solumedrol     Comments:    Other Comments: NPO. Meds reviewed. Pt with chronic  GERD s/p heller myotomy with revision. Will give bicitra now. Sarcoidosis is stable. Pt with chronic cough.    No problems with anesthesia in past. Pt has a good airway.       H&P reviewed: Unable to attach H&P to encounter due to EHR limitations. H&P Update: appropriate H&P reviewed, patient examined. No interval changes since H&P (within 30 days).         Patience Dennison MD

## 2023-09-13 NOTE — ANESTHESIA CARE TRANSFER NOTE
Patient: Bandar Guevara    Procedure: Procedure(s):  MICROLARYNGOSCOPY with suture lateralization       Diagnosis: Bilateral vocal fold paralysis [J38.02]  Diagnosis Additional Information: No value filed.    Anesthesia Type:   General     Note:    Oropharynx: oropharynx clear of all foreign objects and spontaneously breathing  Level of Consciousness: drowsy  Oxygen Supplementation: face mask  Level of Supplemental Oxygen (L/min / FiO2): 6  Independent Airway: airway patency satisfactory and stable  Dentition: dentition unchanged  Vital Signs Stable: post-procedure vital signs reviewed and stable  Report to RN Given: handoff report given  Patient transferred to: PACU    Handoff Report: Identifed the Patient, Identified the Reponsible Provider, Reviewed the pertinent medical history, Discussed the surgical course, Reviewed Intra-OP anesthesia mangement and issues during anesthesia, Set expectations for post-procedure period and Allowed opportunity for questions and acknowledgement of understanding      Vitals:  Vitals Value Taken Time   BP     Temp     Pulse 67 09/13/23 1201   Resp 14 09/13/23 1201   SpO2 100 % 09/13/23 1201   Vitals shown include unvalidated device data.    Electronically Signed By: NILESH Shepard CRNA  September 13, 2023  12:02 PM

## 2023-09-13 NOTE — OR NURSING
Doctor Vasu at bedside. Augmentin prescribed. Patient has allergy to Augmentin. Dr. Leone encourages patient to take this medication after a large meal to avoid GI upset.

## 2023-09-13 NOTE — DISCHARGE INSTRUCTIONS
[facility]  Same-Day Surgery   Adult Discharge Orders & Instructions     For 24 hours after surgery    Get plenty of rest.  A responsible adult must stay with you for at least 24 hours after you leave the hospital.   Do not drive or use heavy equipment.  If you have weakness or tingling, don't drive or use heavy equipment until this feeling goes away.  Do not drink alcohol.  Avoid strenuous or risky activities.  Ask for help when climbing stairs.   You may feel lightheaded.  IF so, sit for a few minutes before standing.  Have someone help you get up.   If you have nausea (feel sick to your stomach): Drink only clear liquids such as apple juice, ginger ale, broth or 7-Up.  Rest may also help.  Be sure to drink enough fluids.  Move to a regular diet as you feel able.  You may have a slight fever. Call the doctor if your fever is over 100 F (37.7 C) (taken under the tongue) or lasts longer than 24 hours.  You may have a dry mouth, a sore throat, muscle aches or trouble sleeping.  These should go away after 24 hours.  Do not make important or legal decisions.   Call your doctor for any of the followin.  Signs of infection (fever, growing tenderness at the surgery site, a large amount of drainage or bleeding, severe pain, foul-smelling drainage, redness, swelling).    2. It has been over 8 to 10 hours since surgery and you are still not able to urinate (pass water).    3.  Headache for over 24 hours.    To contact a doctor, call [phone] or:  '   [phone] and ask for the resident on call for Otolaryngology (answered 24 hours a day)  '   Emergency Department:  [facility]: [phone]       (TTY for hearing impaired: [phone])

## 2023-09-13 NOTE — BRIEF OP NOTE
[facility]    Brief Operative Note    Pre-operative diagnosis: Bilateral vocal fold paralysis [J38.02]  Post-operative diagnosis Same as pre-operative diagnosis    Procedure: Procedure(s):  MICROLARYNGOSCOPY with suture lateralization  Surgeon: Surgeon(s) and Role:     * Keysha Leone MD - Primary     * Gisell Sánchez MD - Resident - Assisting     * Anca Colunga MD - Resident - Assisting  Anesthesia: General   Estimated Blood Loss: Minimal    Drains: None  Specimens: * No specimens in log *  Findings:   R VC fixation, L VC mobility. 4-0 prolene suture passed around mid-posterior aspect of L TVC for lateralization. Neck incision closed with 3-0,4-0 vicryl and 4-0 monocryl  Complications: None.  Implants: * No implants in log *

## 2023-09-13 NOTE — ANESTHESIA POSTPROCEDURE EVALUATION
Patient: Bandar Guevara    Procedure: Procedure(s):  MICROLARYNGOSCOPY with suture lateralization       Anesthesia Type:  General    Note:  Disposition: Outpatient   Postop Pain Control: Uneventful            Sign Out: Well controlled pain   PONV: No   Neuro/Psych: Uneventful            Sign Out: Acceptable/Baseline neuro status   Airway/Respiratory: Uneventful            Sign Out: Acceptable/Baseline resp. status   CV/Hemodynamics: Uneventful            Sign Out: Acceptable CV status; No obvious hypovolemia; No obvious fluid overload   Other NRE: NONE   DID A NON-ROUTINE EVENT OCCUR? No           Last vitals:  Vitals Value Taken Time   /80 09/13/23 1300   Temp 36.1  C (97  F) 09/13/23 1200   Pulse 73 09/13/23 1301   Resp 22 09/13/23 1301   SpO2 100 % 09/13/23 1301   Vitals shown include unvalidated device data.    Electronically Signed By: Patience Dennison MD  September 13, 2023  1:02 PM

## 2023-09-14 ENCOUNTER — PATIENT OUTREACH (OUTPATIENT)
Dept: OTOLARYNGOLOGY | Facility: CLINIC | Age: 68
End: 2023-09-14

## 2023-09-14 ENCOUNTER — TELEPHONE (OUTPATIENT)
Dept: OTOLARYNGOLOGY | Facility: CLINIC | Age: 68
End: 2023-09-14

## 2023-09-14 NOTE — PROGRESS NOTES
Called patient to check in after surgery. Patient is doing well, pain is controlled and patient is able to swallow. Patient will remove gauze but not steri-strips 24 hours after the procedure. Patient agreed to move appointment for post-op on 9/28 to 9/29 at 1:30 PM. Patient will reach out if any questions or concerns arise before post-op. Patient expressed knowledge to take Augmentin and only switch to the Clindamycin if GI upset occurs.

## 2023-09-19 ENCOUNTER — PATIENT OUTREACH (OUTPATIENT)
Dept: OTOLARYNGOLOGY | Facility: CLINIC | Age: 68
End: 2023-09-19
Payer: COMMERCIAL

## 2023-09-20 NOTE — PROGRESS NOTES
Writer reached out to patient and his wife. Patients wife Ana states they went to the hospital yesterday and had some labs done and an IV.     Patient is home now and feeling a little better, he has been able to keep some sustenance down. Ana states that when he starts coughing it triggers him to vomit. I advised that he try to take in small amounts at a time with breaks in between and if he is still vomiting he needs to return to the ER for possible admission.     Patients wife agreed to the plan.     Vickie Mehta RN on 9/20/2023 at 12:37 PM

## 2023-09-21 ENCOUNTER — PATIENT OUTREACH (OUTPATIENT)
Dept: OTOLARYNGOLOGY | Facility: CLINIC | Age: 68
End: 2023-09-21
Payer: COMMERCIAL

## 2023-09-21 NOTE — PROGRESS NOTES
Called patient to check in after needing to go to the ER to replenish fluids from surgery. Per patient wife patient is coughing randomly and only sometimes vomits with thin liquids. Writer advised patient to keep track of when patient coughs if it is from drinking, and if he vomits. Writer will call back to check in and see what they find.   If patient is choking on thin liquids then patient will come in for FEES if not patient will keep original post-op appointment. Vickie Muñoz RN on 9/21/2023 at 8:54 AM

## 2023-09-22 ENCOUNTER — OFFICE VISIT (OUTPATIENT)
Dept: OTOLARYNGOLOGY | Facility: CLINIC | Age: 68
End: 2023-09-22
Payer: COMMERCIAL

## 2023-09-22 ENCOUNTER — THERAPY VISIT (OUTPATIENT)
Dept: SPEECH THERAPY | Facility: CLINIC | Age: 68
End: 2023-09-22
Payer: COMMERCIAL

## 2023-09-22 DIAGNOSIS — R49.0 DYSPHONIA: ICD-10-CM

## 2023-09-22 DIAGNOSIS — R49.0 DYSPHONIA: Primary | ICD-10-CM

## 2023-09-22 DIAGNOSIS — J38.00 VOCAL CORD PARESIS: Primary | ICD-10-CM

## 2023-09-22 DIAGNOSIS — J38.02 BILATERAL VOCAL FOLD PARALYSIS: ICD-10-CM

## 2023-09-22 DIAGNOSIS — R06.00 DYSPNEA, UNSPECIFIED TYPE: ICD-10-CM

## 2023-09-22 DIAGNOSIS — J38.7 LARYNGEAL HYPERFUNCTION: ICD-10-CM

## 2023-09-22 DIAGNOSIS — R13.10 DYSPHAGIA, UNSPECIFIED TYPE: ICD-10-CM

## 2023-09-22 PROCEDURE — 92612 ENDOSCOPY SWALLOW (FEES) VID: CPT | Mod: GN | Performed by: OTOLARYNGOLOGY

## 2023-09-22 PROCEDURE — 92610 EVALUATE SWALLOWING FUNCTION: CPT | Mod: GN | Performed by: SPEECH-LANGUAGE PATHOLOGIST

## 2023-09-22 PROCEDURE — 99214 OFFICE O/P EST MOD 30 MIN: CPT | Mod: 25 | Performed by: OTOLARYNGOLOGY

## 2023-09-22 PROCEDURE — 99207 PR NO BILLABLE SERVICE THIS VISIT: CPT | Performed by: SPEECH-LANGUAGE PATHOLOGIST

## 2023-09-22 PROCEDURE — 92613 ENDOSCOPY SWALLOW (FEES) I&R: CPT | Performed by: OTOLARYNGOLOGY

## 2023-09-22 NOTE — LETTER
2023       RE: Bandar Guevara  [address]  [facility] [address]     Dear Colleague,    Thank you for referring your patient, Bandar Guevara, to the [facility] [employer] at [facility]. Please see a copy of my visit note below.        [facility]   at the [facility]   Otolaryngology Clinic     Patient: Bandar Guevara    MRN: [id number]    : 1955    Age/Gender: 67 year old male  Date of Service: 2023  Rendering Provider:   Keysha Leone MD     Chief Complaint   Bilateral vocal fold paralysis  Dysphonia  S/p MDL with  left suture lateralization 23  Interval History   HISTORY OF PRESENT ILLNESS: Mr. Guevara is a 67 year old male is being followed for dysphonia.   he was initially seen on 2023. Please refer to this note for full history.      Of note patient has history of sarcoidosis s/p lung biopsy. History of dysphagia     Today, he presents for follow up. he reports:  - has been vomiting and nauseated  - went to the ER for hydration      PAST MEDICAL HISTORY:   Past Medical History:   Diagnosis Date    Achalasia     Coronary artery disease     Dysphagia     Hypertension     Sarcoidosis     Stented coronary artery        PAST SURGICAL HISTORY:   Past Surgical History:   Procedure Laterality Date    ABDOMEN SURGERY      heller myotomy    BRONCHOSCOPY FLEXIBLE N/A 2023    Procedure: Bronchoscopy flexible;  Surgeon: Keysha Leone MD;  Location: [facility] OR    coronary artery stent placement N/A     ENDOBRONCHIAL ULTRASOUND      ESOPHAGOSCOPY      LARYNGOSCOPY WITH MICROSCOPE N/A 2023    Procedure: MICROLARYNGOSCOPY with suture lateralization;  Surgeon: Keysha Leone MD;  Location:  OR    LASER CO2 LARYNGOSCOPY N/A 2023    Procedure: MICROLARYNGOSCOPY, awake fiberoptic intubation, CO2 laser on standby;  Surgeon: Keysha Leone MD;  Location: [facility] OR    ORTHOPEDIC SURGERY         CURRENT  MEDICATIONS:   Current Outpatient Medications:     acetaminophen (TYLENOL) 500 MG tablet, Take 1,000 mg by mouth every 8 hours as needed, Disp: , Rfl:     aspirin (ASA) 81 MG EC tablet, Take 81 mg by mouth daily, Disp: , Rfl:     brimonidine (ALPHAGAN-P) 0.15 % ophthalmic solution, Apply 1 drop to eye, Disp: , Rfl:     budesonide-formoterol (SYMBICORT) 160-4.5 MCG/ACT Inhaler, Inhale 2 puffs into the lungs as needed, Disp: , Rfl:     losartan (COZAAR) 50 MG tablet, Take 1 tablet by mouth daily, Disp: , Rfl:     metoprolol succinate ER (TOPROL XL) 25 MG 24 hr tablet, Take by mouth every 12 hours, Disp: , Rfl:     Multiple Vitamin (MULTIVITAMIN ADULT) TABS, Take 1 tablet by mouth daily, Disp: , Rfl:     omeprazole (PRILOSEC) 20 MG DR capsule, Take 20 mg by mouth 2 times daily, Disp: , Rfl:     ondansetron (ZOFRAN ODT) 4 MG ODT tab, Take 4 mg by mouth every 6 hours as needed, Disp: , Rfl:     rosuvastatin (CRESTOR) 20 MG tablet, Take 20 mg by mouth At Bedtime, Disp: , Rfl:     triamcinolone (KENALOG) 0.1 % external cream, Apply a thin layer to the face twice daily for 2 weeks on and 2 weeks off., Disp: , Rfl:     Vitamin D3 (VITAMIN D, CHOLECALCIFEROL,) 25 mcg (1000 units) tablet, Take 1 tablet by mouth daily, Disp: , Rfl:     ALLERGIES: Augmentin [amoxicillin-pot clavulanate], Clavulanic acid, and Sulfa antibiotics    SOCIAL HISTORY:    Social History     Socioeconomic History    Marital status:      Spouse name: Not on file    Number of children: Not on file    Years of education: Not on file    Highest education level: Not on file   Occupational History    Not on file   Tobacco Use    Smoking status: Never    Smokeless tobacco: Never   Vaping Use    Vaping Use: Never used   Substance and Sexual Activity    Alcohol use: Not Currently    Drug use: Not Currently    Sexual activity: Not on file   Other Topics Concern    Not on file   Social History Narrative    Not on file     Social Determinants of Health      Financial Resource Strain: Not on file   Food Insecurity: Not on file   Transportation Needs: Not on file   Physical Activity: Not on file   Stress: Not on file   Social Connections: Not on file   Interpersonal Safety: Not on file   Housing Stability: Not on file         FAMILY HISTORY: No family history on file.   Non-contributory for problems with anesthesia    REVIEW OF SYSTEMS:   The patient was asked a 14 point review of systems regarding constitutional symptoms, eye symptoms, ears, nose, mouth, throat symptoms, cardiovascular symptoms, respiratory symptoms, gastrointestinal symptoms, genitourinary symptoms, musculoskeletal symptoms, integumentary symptoms, neurological symptoms, psychiatric symptoms, endocrine symptoms, hematologic/lymphatic symptoms, and allergic/ immunologic symptoms.   The pertinent factors have been included in the HPI and below.  Patient Supplied Answers to Review of Systems       No data to display                Physical Examination   The patient underwent a physical examination as described below. The pertinent positive and negative findings are summarized after the description of the examination.  Constitutional: The patient's developmental and nutritional status was assessed. The patient's voice quality was assessed.  Head and Face: The head and face were inspected for deformities. The sinuses were palpated. The salivary glands were palpated. Facial muscle strength was assessed bilaterally.  Eyes: Extraocular movements and primary gaze alignment were assessed.  Ears, Nose, Mouth and Throat: The ears and nose were examined for deformities. The nasal septum, mucosa, and turbinates were inspected by anterior rhinoscopy. The lips, teeth, and gums were examined for abnormalities. The oral mucosa, tongue, palate, tonsils, lateral and posterior pharynx were inspected for the presence of asymmetry or mucosal lesions.    Neck: The tracheal position was noted, and the neck mass palpated to  determine if there were any asymmetries, abnormal neck masses, thyromegally, or thyroid nodules.  Respiratory: The nature of the breathing and chest expansion/symmetry was observed.  Cardiovascular: The patient was examined to determine the presence of any edema or jugular venous distension.  Abdomen: The contour of the abdomen was noted.  Lymphatic: The patient was examined for infraclavicular lymphadenopathy.  Musculoskeletal: The patient was inspected for the presence of skeletal deformities.  Extremities: The extremities were examined for any clubbing or cyanosis.  Skin: The skin was examined for inflammatory or neoplastic conditions.  Neurologic: The patient's orientation, mood, and affect were noted. The cranial nerve  functions were examined.  Other pertinent positive and negative findings on physical examination:   OC/OP: no lesions, uvula midline, soft palate elevates symmetrically   Neck: no lesions, no TH tenderness to palpation   Well healed incision  All other physical examination findings were within normal limits and noncontributory.    Procedures      Flexible laryngoscopy (CPT 37801)        Pre-procedure diagnosis: dysphonia   Post-procedure diagnosis: same as above  Indication for procedure: Mr. Guevara is a 67 year old male with see above  Procedure(s): Fiberoptic Laryngoscopy     Details of Procedure: After informed consent was obtained, the patient was seated in the examination chair.  The areas of the nasopharynx as well as the hypopharynx were anesthetized with topical 4% lidocaine with 0.25% phenylephrine atomizer.  Examination of the base of tongue was performed first.  Attention was directed to any evidence of masses in the area or evidence of leukoplakia or candidal infection.  Attention was directed to the epiglottis where its size and position was determined and its movement on phonation of the vowel  e .  The piriform sinuses were then inspected for any mass lesions or pooling of  secretions.  Attention was then directed to the larynx. The vocal folds were inspected for infection or any areas of leukoplakia, for masses, polypoid degeneration, or hemorrhage.  Having done this, the arytenoids and vocal processes were inspected for erythema or evidence of granuloma formation.  The posterior commissure was then inspected for evidence of inflammatory changes in the mucosa and heaping up of mucosal tissue. The patient was then instructed to say the vowel  e .  Adduction of vocal folds to the midline was observed for any evidence of paresis or paralysis of the larynx or asymmetry in rotation of the larynx to the left or right. The patient was asked to breathe and the degree of abduction was noted bilaterally.  Subglottic view of the larynx was obtained for any additional mass lesions or mucosal changes.  Finally the post cricoid was examined for evidence of pooling of secretions, as well as the pharyngeal wall mucosa.   Anesthesia type: 0.25% phenylephrine     Findings:  Anatomic/physiological deviations: RNC, bilateral vocal fold paralysis with severe vocal fold atrophy, left vocal fold lateralization stitch in place, left vocal fold is swollen and red               Right vocal process: Marked restriction of mobility   Left vocal process: Marked restriction of mobility  Glottal gap: Glottal gap  Supraglottic structures: Normal  Hypopharynx: Normal      Estimated Blood Loss: minimal  Complications: None  Disposition: Patient tolerated the procedure well      Fiberoptic Endoscopic Evaluation of Swallowing (CPT 03826)  and Interpretation of Swallowing (CPT 65265)     Indications: See above notes for complete history and physical. Patient complains of dysphagia to liquids and/or there is suggestion on history and endoscopic exam of the presence of dysphagia causing medical complaints.  Swallowing evaluation is being performed to assess the presence and degree of dysphagia, and to recommend a safe  diet.     Pulmonary Status:        No PNA   Current Diet:                regular                                                    thin liquids      Consistency Amounts:  Thin Liquid: sip   Puree: 1 tsp  Solid: cookies            Positioning: upright in a chair  Oral Peripheral Exam: See physical exam section.  Anatomic Notes: See Videostroboscopy report for assessment of anatomy and laryngeal functioning  Pharyngeal secretions prior to administration of liquid or food: Yes  Oral Phase Abnormal Findings: No abnormal behavior observed  Behavioral Adaptations: No abnormal behavior observed  Pharyngeal Phase Abnormal Findings: penetration with thins      Recommended Diet:  regular                                        thin liquids         Review of Relevant Clinical Data   I personally reviewed:  Notes:    Radiology: CT neck 6/7/23  caha in the right vocal fold        Labs:  No results found for: TSH  No results found for: NA, CO2, BUN, CREAT, GLUCOSE, PHOS  No results found for: WBC, HGB, HCT, MCV, PLT  No results found for: PT, PTT, INR  No results found for: AHSAN  No components found for: RHEUMATOIDFACTOR,  RF  No results found for: CRP  No components found for: CKTOT, URICACID  No components found for: C3, C4, DSDNAAB, NDNAABIFA  No results found for: MPOAB    Patient reported Quality of Life (QOL) Measures   Patient Supplied Answers To VHI Questionnaire       No data to display                  Patient Supplied Answers To EAT Questionnaire       No data to display                  Patient Supplied Answers To CSI Questionnaire       No data to display                  Patient Supplied Answers to Dyspnea Index Questionnaire:       No data to display                Impression & Plan     IMPRESSION: Mr. Guevara is a 67 year old male who is being seen for the following:        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   - history of sarcoidosis s/p lung biopsy with subsequent  cough for 3 years   - has received right vocal fold augmentation through [facility] with benefit for about 2 weeks   - now voice is very weak and gets winded with voice use   - scope shows bilateral vocal fold paralysis with severe vocal fold atrophy  - FEES shows no penetration, no aspiration   - discussed etiology of vocal fold paralysis in this case is unclear   - discussed option of diagnostic MDL to evaluate breathing with evaluation of scar band formation and if not consideration of suture lateralization or trach placement  - Reviewed CT chest with radiology. There are calcified nodes in mediastinum but do not explain reason for vocal fold paralysis.  - symptoms 5/30/2023 are stable  - discussed that vocal folds are too close together for additional filler, discussed options of diagnostic surgery to see how far vocal folds can be moved apart  - discussed if stenosis - if scar band ok to laser, if CA joint ankylosis, then options are a trach, if vocal fold paralysis - then consider vocal fold suture lateralization   - CT neck - no lesions - he still has CAHA in the right vocal fold  - diagnostic MDL with finding of left CA joint mobile, right CA joint immobile, no glottic scar band posteriorly 7/5/23  - symptoms 8/14/2023 are not improved, still difficulty with voicing  Patient seen today for laryngeal EMG  The left CT and TA showed fibrillation potentials and denervation changes  Feels that voice is improved now - likely due to some of the swelling from the needle  Discussed options of observation, amplifier or attempt at suture lateralization on the left with trial of augmentation after. Discussed risk of trach given his limited airway  Patient would like to proceed with intervention. He is using amplifier without much help.  S/p MDL with  left suture lateralization 7/5/23  - symptoms today 9/22/23 - has been vomiting and coughing with liquids  - s/p MDL with  left suture lateralization 7/5/23  - has been  vomiting and coughing since surgery   - scope shows bilateral vocal fold paralysis with severe vocal fold atrophy, left vocal fold lateralization stitch in place, left vocal fold is swollen and red  - FEES shows penetration with thins   Plan  - swallow precautions  - needs repeat exam next week to check on swallow  - will need to allow swelling to go down before any further assessment can be done    RETURN VISIT: 1 week    Keysha Leone MD    Laryngology    [facility]  Department of  Otolaryngology - Head and Neck Surgery  Clinics & [place] [address]  Appointment line: [phone]  Fax: [phone]  [email or web address]         Again, thank you for allowing me to participate in the care of your patient.      Sincerely,    Keysha Leone MD

## 2023-09-22 NOTE — PATIENT INSTRUCTIONS
Sit upright for eating/drinking    Take small sips    Each sip you should swallow hard, then cough, then swallow again.

## 2023-09-22 NOTE — PROGRESS NOTES
SPEECH LANGUAGE PATHOLOGY EVALUATION    See electronic medical record for Abuse and Falls Screening details.    Subjective      Presenting condition or subjective complaint:  Pt reports increased coughing and choking when drinking. He also notes coughing to the point of vomiting sometimes.  Date of onset: 09/13/23    Relevant medical history:   Bilateral vocal fold paralysis, sarcoidosis, heart disease, esophageal achalasia  Dates & types of surgery:  9/13/23: microlaryngoscopy with suture lateralization of vocal fold    Prior diagnostic imaging/testing results:     5/9/23: endoscopic swallow revealed safe functional oropharyngeal swallow.   Prior therapy history for the same diagnosis, illness or injury:        Living Environment  Social support:   lives with SO    Patient goals for therapy:  pt wants to eat/drink more effectively    Pain assessment: Pain denied     Objective     SWALLOW EVALUTION  Dysphagia history: Pt reports increased coughing since surgery and frequent emesis.   Current Diet/Method of Nutritional Intake: thin liquids (level 0), easy to chew (level 7)        CLINICAL SWALLOW EVALUATION  Oral Motor Function: Dentition: natural dentition  Secretion management: moderate pharyngeal secretions noted upon entering nasopharynx  Mucosal quality: adequate  Mandibular function: intact  Oral labial function: WFL  Lingual function: WFL  Velar function: intact   Buccal function: intact  Laryngeal function: impaired vocal quality: hoarse and breathy, bilateral vocal fold paralysis s/p suture lateralization      Level of assist required for feeding: no assistance needed   Textures Trialed:   Clinical Swallow Eval: Thin Liquids  Mode of presentation: cup, straw, self-fed   Volume presented: 5 oz blue tinged milk  Preparatory Phase: WFL  Oral Phase: premature pharyngeal entry  Pharyngeal phase of swallow: impaired, throat clearing, trace aspiration noted on thin liquid trials    Strategies trialed during  procedure: [facility] SLP CLINICAL EVAL STRATEGIES: chin tuck, hard swallow not successful  Diagnostic statement: trace aspiration noted on thin liquid trials. Pt is asensate to aspiration. Cued cough was effective in clearing some of the aspirate.    Clinical Swallow Eval: Mildly Thick Liquids  Mode of presentation: cup, spoon, straw   Volume presented: 2 oz  Preparatory Phase: WFL  Oral Phase: premature pharyngeal entry  Pharyngeal phase of swallow: impaired, no awareness of problems, throat clearing   Strategies trialed during procedure: [facility] SLP CLINICAL EVAL STRATEGIES: chin tuck, hard swallow not effective  Diagnostic statement: trace aspiration noted on mildly thick liquid trials. Pt is able to clear aspirated material with cued cough. He does not cough in response to aspiration. He does not demonstrate improved airway protection with thicker consistency.      ESOPHAGEAL PHASE OF SWALLOW  Pt has known esophageal dysphagia      SWALLOW ASSESSMENT CLINICAL IMPRESSIONS AND RATIONALE  Diet Consistency Recommendations: oral diet, thin liquids (level 0), soft & bite-sized (level 6)    Recommended Feeding/Eating Techniques: small bolus size, slow rate of intake, effortful (hard) swallow, pt to cough after each sip of liquid then swallow again    Medication Administration Recommendations: whole one at a time in puree  Instrumental Assessment Recommendations: ongoing assessment     Assessment & Plan   CLINICAL IMPRESSIONS   Medical Diagnosis: bilateral vocal fold paralysis    Treatment Diagnosis: moderate oropharyngeal dysphagia   Impression/Assessment: Pt is a 67 year old male with bilateral vocal fold paralysis and coughing/choking after surgery complaints. The following significant findings have been identified: impaired swallowing, characterized by trace aspiration on thin liquid and mildly thick liquid trials. Identified deficits interfere with their ability to consume an oral diet safely as compared to previous level of  function.    PLAN OF CARE  Treatment Interventions: Swallowing dysfunction and/or oral function for feeding    Prognosis to achieve stated therapy goals is good   Rehab potential is impacted by: comorbidities, current level of function    Long Term Goals   SLP Goal 1  Goal Identifier: po  Goal Description: Pt will demonstrate improved airway safety with no evidence of aspiration/penetration on po trials.  Rationale: To maximize safety, ease and/or independence of oral intake  Target Date: 12/20/23  SLP Goal 2  Goal Identifier: safe swallow strategies  Goal Description: Pt will adhere to safe swallow strategies independently 100% of the time.  Rationale: To maximize safety, ease and/or independence of oral intake  Target Date: 12/20/23      Frequency of Treatment: 1x/week  Duration of Treatment: 8 weeks     Recommended Referrals to Other Professionals: none  Education Assessment:   Learner/Method: Patient;Significant Other;No Barriers to Learning    Risks and benefits of evaluation/treatment have been explained.   Patient/Family/caregiver agrees with Plan of Care.     Evaluation Time:    SLP Eval: oral/pharyngeal swallow function, clinical minutes (51559): 15      Signing Clinician: NURY Michaud    [facility]                                                                                   OUTPATIENT SPEECH LANGUAGE PATHOLOGY      PLAN OF TREATMENT FOR OUTPATIENT REHABILITATION   Patient's Last Name, First Name, Bandar Warren YOB: 1955   Provider's Name   [facility]   Medical Record No.  [id number]     Onset Date: 09/13/23 Start of Care Date: 09/22/23     Medical Diagnosis:  bilateral vocal fold paralysis      SLP Treatment Diagnosis: moderate oropharyngeal dysphagia  Plan of Treatment  Frequency/Duration: 1x/week  / 8 weeks     Certification date from 09/22/23   To 12/20/23          See note for plan of treatment details and  functional goals     Uma Eugene, SLP                         I CERTIFY THE NEED FOR THESE SERVICES FURNISHED UNDER        THIS PLAN OF TREATMENT AND WHILE UNDER MY CARE     (Physician attestation of this document indicates review and certification of the therapy plan).                Referring Provider:  Dr. Keysha Leone      Initial Assessment  See Epic Evaluation- 09/22/23

## 2023-09-22 NOTE — PROGRESS NOTES
[facility]  Clinical Voice and Upper Airway Evaluation Report    Patient's Name: Bandar Guevara  Date of Evaluation: 9/22/2023  Providing SLP: Shalonda Fernández MS CCC-SLP  Seen in Conjunction With: Keysha Leone MD - Uma Eugene, CCC-SLP  Insurance Coverage: [employer] [facility] [employer]  Chief Complaint: Dyspnea, Dysphagia, Dysphonia  Evaluation Location: [facility]  Others in Attendance for the Evaluation: his wife, Ana  Time of Evaluation: 9:22 - 9:27 AM      Impressions from initial evaluation on 5/9/23 with Luigi Barber, PhD CCC-SLP:    Bandar Guevara is a 67 year old with profound Dysphonia (R49.0) characterized by breathiness and severely reduced loudness, as well as intermittent inspiratory stridor in the context of suspected laryngospasm following laryngopharyngeal reflux events due to esophageal achalasia.  Laryngeal exam demonstrates severely reduced bilateral vocal fold mobility with near fixation near the midline and very subtle abductory and abductory movement bilaterally, left greater than right.  Dr. Leone plans to take the patient to the operating room to determine whether a posterior glottic scar band could account for some aspects of limited vocal fold mobility.  In addition, it is recommended that the patient undergo a brief course of speech therapy (1 session), targeted at optimizing respiratory mechanics in the context of limited glottic airway.       Patient History / Impressions / Plan:     Bandar is a 67-year-old male who presents today for follow-up with Dr. Leone. He recent underwent a L vocal fold lateralization procedure secondary to bilateral vocal fold paralysis on 9/13/23. Since then, he has been losing weight and vomiting often. He has not been feeling well and received IV fluids in the ED on 9/19/23. Perceptually, his voice is aphonic. He reports that his breathing has improved some. During laryngoscopy today, the glottic airway is minimally larger than  pre-operatively with diffused erythema of the entire L vocal fold, significant bowing of the vibratory edges, and significant mediolateral and anteroposterior compression. Pursed lip inhalations were attempted; however, Bandar could not achieve appropriate lip rounding or inspiration speed despite maximal clinician cueing and modeling. Please see the reports from my colleagues, Uma Eugene and Dr. Leone, regarding swallow function. At this time, no additional voice therapy services are appropriate, and he will continue working with Dr. Leone for surgical management      Thank you for allowing me to participate in this patient's care,    Shalonda Fernández MS CCC-SLP  Speech-Language Pathologist  [facility]  Department of Otolaryngology - Head and Neck Surgery  [facility]  [email or web address]  Direct: [phone]  [phone]    *This note may have been completed using [id number] dictation software, so errors may exist. Please contact me for clarification if needed*

## 2023-09-22 NOTE — PROGRESS NOTES
[facility]   at the [facility]   Otolaryngology Clinic     Patient: Bandar Guevara    MRN: [id number]    : 1955    Age/Gender: 67 year old male  Date of Service: 2023  Rendering Provider:   Keysha Leone MD     Chief Complaint   Bilateral vocal fold paralysis  Dysphonia  S/p MDL with  left suture lateralization 23  Interval History   HISTORY OF PRESENT ILLNESS: Mr. Guevara is a 67 year old male is being followed for dysphonia.   he was initially seen on 2023. Please refer to this note for full history.      Of note patient has history of sarcoidosis s/p lung biopsy. History of dysphagia     Today, he presents for follow up. he reports:  - has been vomiting and nauseated  - went to the ER for hydration      PAST MEDICAL HISTORY:   Past Medical History:   Diagnosis Date    Achalasia     Coronary artery disease     Dysphagia     Hypertension     Sarcoidosis     Stented coronary artery        PAST SURGICAL HISTORY:   Past Surgical History:   Procedure Laterality Date    ABDOMEN SURGERY      heller myotomy    BRONCHOSCOPY FLEXIBLE N/A 2023    Procedure: Bronchoscopy flexible;  Surgeon: Keysha Leone MD;  Location: [facility] OR    coronary artery stent placement N/A     ENDOBRONCHIAL ULTRASOUND      ESOPHAGOSCOPY      LARYNGOSCOPY WITH MICROSCOPE N/A 2023    Procedure: MICROLARYNGOSCOPY with suture lateralization;  Surgeon: Keysha Leone MD;  Location: [facility] OR    LASER CO2 LARYNGOSCOPY N/A 2023    Procedure: MICROLARYNGOSCOPY, awake fiberoptic intubation, CO2 laser on standby;  Surgeon: Keysha Leone MD;  Location: [facility] OR    ORTHOPEDIC SURGERY         CURRENT MEDICATIONS:   Current Outpatient Medications:     acetaminophen (TYLENOL) 500 MG tablet, Take 1,000 mg by mouth every 8 hours as needed, Disp: , Rfl:     aspirin (ASA) 81 MG EC tablet, Take 81 mg by mouth daily, Disp: , Rfl:     brimonidine (ALPHAGAN-P) 0.15 % ophthalmic solution, Apply 1 drop to eye, Disp: , Rfl:      budesonide-formoterol (SYMBICORT) 160-4.5 MCG/ACT Inhaler, Inhale 2 puffs into the lungs as needed, Disp: , Rfl:     losartan (COZAAR) 50 MG tablet, Take 1 tablet by mouth daily, Disp: , Rfl:     metoprolol succinate ER (TOPROL XL) 25 MG 24 hr tablet, Take by mouth every 12 hours, Disp: , Rfl:     Multiple Vitamin (MULTIVITAMIN ADULT) TABS, Take 1 tablet by mouth daily, Disp: , Rfl:     omeprazole (PRILOSEC) 20 MG DR capsule, Take 20 mg by mouth 2 times daily, Disp: , Rfl:     ondansetron (ZOFRAN ODT) 4 MG ODT tab, Take 4 mg by mouth every 6 hours as needed, Disp: , Rfl:     rosuvastatin (CRESTOR) 20 MG tablet, Take 20 mg by mouth At Bedtime, Disp: , Rfl:     triamcinolone (KENALOG) 0.1 % external cream, Apply a thin layer to the face twice daily for 2 weeks on and 2 weeks off., Disp: , Rfl:     Vitamin D3 (VITAMIN D, CHOLECALCIFEROL,) 25 mcg (1000 units) tablet, Take 1 tablet by mouth daily, Disp: , Rfl:     ALLERGIES: Augmentin [amoxicillin-pot clavulanate], Clavulanic acid, and Sulfa antibiotics    SOCIAL HISTORY:    Social History     Socioeconomic History    Marital status:      Spouse name: Not on file    Number of children: Not on file    Years of education: Not on file    Highest education level: Not on file   Occupational History    Not on file   Tobacco Use    Smoking status: Never    Smokeless tobacco: Never   Vaping Use    Vaping Use: Never used   Substance and Sexual Activity    Alcohol use: Not Currently    Drug use: Not Currently    Sexual activity: Not on file   Other Topics Concern    Not on file   Social History Narrative    Not on file     Social Determinants of Health     Financial Resource Strain: Not on file   Food Insecurity: Not on file   Transportation Needs: Not on file   Physical Activity: Not on file   Stress: Not on file   Social Connections: Not on file   Interpersonal Safety: Not on file   Housing Stability: Not on file         FAMILY HISTORY: No family history on file.    Non-contributory for problems with anesthesia    REVIEW OF SYSTEMS:   The patient was asked a 14 point review of systems regarding constitutional symptoms, eye symptoms, ears, nose, mouth, throat symptoms, cardiovascular symptoms, respiratory symptoms, gastrointestinal symptoms, genitourinary symptoms, musculoskeletal symptoms, integumentary symptoms, neurological symptoms, psychiatric symptoms, endocrine symptoms, hematologic/lymphatic symptoms, and allergic/ immunologic symptoms.   The pertinent factors have been included in the HPI and below.  Patient Supplied Answers to Review of Systems       No data to display                Physical Examination   The patient underwent a physical examination as described below. The pertinent positive and negative findings are summarized after the description of the examination.  Constitutional: The patient's developmental and nutritional status was assessed. The patient's voice quality was assessed.  Head and Face: The head and face were inspected for deformities. The sinuses were palpated. The salivary glands were palpated. Facial muscle strength was assessed bilaterally.  Eyes: Extraocular movements and primary gaze alignment were assessed.  Ears, Nose, Mouth and Throat: The ears and nose were examined for deformities. The nasal septum, mucosa, and turbinates were inspected by anterior rhinoscopy. The lips, teeth, and gums were examined for abnormalities. The oral mucosa, tongue, palate, tonsils, lateral and posterior pharynx were inspected for the presence of asymmetry or mucosal lesions.    Neck: The tracheal position was noted, and the neck mass palpated to determine if there were any asymmetries, abnormal neck masses, thyromegally, or thyroid nodules.  Respiratory: The nature of the breathing and chest expansion/symmetry was observed.  Cardiovascular: The patient was examined to determine the presence of any edema or jugular venous distension.  Abdomen: The contour of  the abdomen was noted.  Lymphatic: The patient was examined for infraclavicular lymphadenopathy.  Musculoskeletal: The patient was inspected for the presence of skeletal deformities.  Extremities: The extremities were examined for any clubbing or cyanosis.  Skin: The skin was examined for inflammatory or neoplastic conditions.  Neurologic: The patient's orientation, mood, and affect were noted. The cranial nerve  functions were examined.  Other pertinent positive and negative findings on physical examination:   OC/OP: no lesions, uvula midline, soft palate elevates symmetrically   Neck: no lesions, no TH tenderness to palpation   Well healed incision  All other physical examination findings were within normal limits and noncontributory.    Procedures      Flexible laryngoscopy (CPT 37565)        Pre-procedure diagnosis: dysphonia   Post-procedure diagnosis: same as above  Indication for procedure: Mr. Guevara is a 67 year old male with see above  Procedure(s): Fiberoptic Laryngoscopy     Details of Procedure: After informed consent was obtained, the patient was seated in the examination chair.  The areas of the nasopharynx as well as the hypopharynx were anesthetized with topical 4% lidocaine with 0.25% phenylephrine atomizer.  Examination of the base of tongue was performed first.  Attention was directed to any evidence of masses in the area or evidence of leukoplakia or candidal infection.  Attention was directed to the epiglottis where its size and position was determined and its movement on phonation of the vowel  e .  The piriform sinuses were then inspected for any mass lesions or pooling of secretions.  Attention was then directed to the larynx. The vocal folds were inspected for infection or any areas of leukoplakia, for masses, polypoid degeneration, or hemorrhage.  Having done this, the arytenoids and vocal processes were inspected for erythema or evidence of granuloma formation.  The posterior commissure was  then inspected for evidence of inflammatory changes in the mucosa and heaping up of mucosal tissue. The patient was then instructed to say the vowel  e .  Adduction of vocal folds to the midline was observed for any evidence of paresis or paralysis of the larynx or asymmetry in rotation of the larynx to the left or right. The patient was asked to breathe and the degree of abduction was noted bilaterally.  Subglottic view of the larynx was obtained for any additional mass lesions or mucosal changes.  Finally the post cricoid was examined for evidence of pooling of secretions, as well as the pharyngeal wall mucosa.   Anesthesia type: 0.25% phenylephrine     Findings:  Anatomic/physiological deviations: RNC, bilateral vocal fold paralysis with severe vocal fold atrophy, left vocal fold lateralization stitch in place, left vocal fold is swollen and red               Right vocal process: Marked restriction of mobility   Left vocal process: Marked restriction of mobility  Glottal gap: Glottal gap  Supraglottic structures: Normal  Hypopharynx: Normal      Estimated Blood Loss: minimal  Complications: None  Disposition: Patient tolerated the procedure well      Fiberoptic Endoscopic Evaluation of Swallowing (CPT 73293)  and Interpretation of Swallowing (CPT 65794)     Indications: See above notes for complete history and physical. Patient complains of dysphagia to liquids and/or there is suggestion on history and endoscopic exam of the presence of dysphagia causing medical complaints.  Swallowing evaluation is being performed to assess the presence and degree of dysphagia, and to recommend a safe diet.     Pulmonary Status:        No PNA   Current Diet:                regular                                                    thin liquids      Consistency Amounts:  Thin Liquid: sip   Puree: 1 tsp  Solid: cookies            Positioning: upright in a chair  Oral Peripheral Exam: See physical exam section.  Anatomic Notes: See  Videostroboscopy report for assessment of anatomy and laryngeal functioning  Pharyngeal secretions prior to administration of liquid or food: Yes  Oral Phase Abnormal Findings: No abnormal behavior observed  Behavioral Adaptations: No abnormal behavior observed  Pharyngeal Phase Abnormal Findings: penetration with thins      Recommended Diet:  regular                                        thin liquids         Review of Relevant Clinical Data   I personally reviewed:  Notes:    Radiology: CT neck 6/7/23  caha in the right vocal fold        Labs:  No results found for: TSH  No results found for: NA, CO2, BUN, CREAT, GLUCOSE, PHOS  No results found for: WBC, HGB, HCT, MCV, PLT  No results found for: PT, PTT, INR  No results found for: AHSAN  No components found for: RHEUMATOIDFACTOR,  RF  No results found for: CRP  No components found for: CKTOT, URICACID  No components found for: C3, C4, DSDNAAB, NDNAABIFA  No results found for: MPOAB    Patient reported Quality of Life (QOL) Measures   Patient Supplied Answers To VHI Questionnaire       No data to display                  Patient Supplied Answers To EAT Questionnaire       No data to display                  Patient Supplied Answers To CSI Questionnaire       No data to display                  Patient Supplied Answers to Dyspnea Index Questionnaire:       No data to display                Impression & Plan     IMPRESSION: Mr. Guevara is a 67 year old male who is being seen for the following:        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   - history of sarcoidosis s/p lung biopsy with subsequent cough for 3 years   - has received right vocal fold augmentation through [facility] with benefit for about 2 weeks   - now voice is very weak and gets winded with voice use   - scope shows bilateral vocal fold paralysis with severe vocal fold atrophy  - FEES shows no penetration, no aspiration   - discussed etiology of vocal fold  paralysis in this case is unclear   - discussed option of diagnostic MDL to evaluate breathing with evaluation of scar band formation and if not consideration of suture lateralization or trach placement  - Reviewed CT chest with radiology. There are calcified nodes in mediastinum but do not explain reason for vocal fold paralysis.  - symptoms 5/30/2023 are stable  - discussed that vocal folds are too close together for additional filler, discussed options of diagnostic surgery to see how far vocal folds can be moved apart  - discussed if stenosis - if scar band ok to laser, if CA joint ankylosis, then options are a trach, if vocal fold paralysis - then consider vocal fold suture lateralization   - CT neck - no lesions - he still has CAHA in the right vocal fold  - diagnostic MDL with finding of left CA joint mobile, right CA joint immobile, no glottic scar band posteriorly 7/5/23  - symptoms 8/14/2023 are not improved, still difficulty with voicing  Patient seen today for laryngeal EMG  The left CT and TA showed fibrillation potentials and denervation changes  Feels that voice is improved now - likely due to some of the swelling from the needle  Discussed options of observation, amplifier or attempt at suture lateralization on the left with trial of augmentation after. Discussed risk of trach given his limited airway  Patient would like to proceed with intervention. He is using amplifier without much help.  S/p MDL with  left suture lateralization 7/5/23  - symptoms today 9/22/23 - has been vomiting and coughing with liquids  - s/p MDL with  left suture lateralization 7/5/23  - has been vomiting and coughing since surgery   - scope shows bilateral vocal fold paralysis with severe vocal fold atrophy, left vocal fold lateralization stitch in place, left vocal fold is swollen and red  - FEES shows penetration with thins   Plan  - swallow precautions  - needs repeat exam next week to check on swallow  - will need to  allow swelling to go down before any further assessment can be done    RETURN VISIT: 1 week    Keysha Leone MD    Laryngology    [facility]  Department of  Otolaryngology - Head and Neck Surgery  Clinics & Surgery Center  [address] [place] [address]  Appointment line: [phone]  Fax: [phone]  [email or web address] boo

## 2023-09-29 ENCOUNTER — THERAPY VISIT (OUTPATIENT)
Dept: SPEECH THERAPY | Facility: CLINIC | Age: 68
End: 2023-09-29
Payer: COMMERCIAL

## 2023-09-29 ENCOUNTER — OFFICE VISIT (OUTPATIENT)
Dept: OTOLARYNGOLOGY | Facility: CLINIC | Age: 68
End: 2023-09-29
Payer: COMMERCIAL

## 2023-09-29 DIAGNOSIS — R13.10 DYSPHAGIA, UNSPECIFIED TYPE: Primary | ICD-10-CM

## 2023-09-29 DIAGNOSIS — R49.0 DYSPHONIA: ICD-10-CM

## 2023-09-29 DIAGNOSIS — J38.7 LARYNGEAL HYPERFUNCTION: Primary | ICD-10-CM

## 2023-09-29 PROCEDURE — 92613 ENDOSCOPY SWALLOW (FEES) I&R: CPT | Performed by: OTOLARYNGOLOGY

## 2023-09-29 PROCEDURE — 92612 ENDOSCOPY SWALLOW (FEES) VID: CPT | Mod: GN | Performed by: OTOLARYNGOLOGY

## 2023-09-29 PROCEDURE — 99214 OFFICE O/P EST MOD 30 MIN: CPT | Mod: 25 | Performed by: OTOLARYNGOLOGY

## 2023-09-29 PROCEDURE — 92526 ORAL FUNCTION THERAPY: CPT | Mod: GN | Performed by: SPEECH-LANGUAGE PATHOLOGIST

## 2023-09-29 ASSESSMENT — PAIN SCALES - GENERAL: PAINLEVEL: NO PAIN (0)

## 2023-09-29 NOTE — PROGRESS NOTES
[facility]   at the [facility]   Otolaryngology Clinic     Patient: Bandar Guevara    MRN: [id number]    : 1955    Age/Gender: 67 year old male  Date of Service: 2023  Rendering Provider:   Keysha Leone MD     Chief Complaint   Bilateral vocal fold paralysis  Dysphonia  S/p MDL with  left suture lateralization 23  Interval History   HISTORY OF PRESENT ILLNESS: Mr. Guevara is a 67 year old male is being followed for dysphonia.   he was initially seen on 2023. Please refer to this note for full history.        Today, he presents for follow up. he reports:  - doing better in the last week     PAST MEDICAL HISTORY:   Past Medical History:   Diagnosis Date    Achalasia     Coronary artery disease     Dysphagia     Hypertension     Sarcoidosis     Stented coronary artery        PAST SURGICAL HISTORY:   Past Surgical History:   Procedure Laterality Date    ABDOMEN SURGERY      heller myotomy    BRONCHOSCOPY FLEXIBLE N/A 2023    Procedure: Bronchoscopy flexible;  Surgeon: Keysha Leone MD;  Location: [facility] OR    coronary artery stent placement N/A     ENDOBRONCHIAL ULTRASOUND      ESOPHAGOSCOPY      LARYNGOSCOPY WITH MICROSCOPE N/A 2023    Procedure: MICROLARYNGOSCOPY with suture lateralization;  Surgeon: Keysha Leone MD;  Location: [facility] OR    LASER CO2 LARYNGOSCOPY N/A 2023    Procedure: MICROLARYNGOSCOPY, awake fiberoptic intubation, CO2 laser on standby;  Surgeon: Keysha Leone MD;  Location: [facility] OR    ORTHOPEDIC SURGERY         CURRENT MEDICATIONS:   Current Outpatient Medications:     acetaminophen (TYLENOL) 500 MG tablet, Take 1,000 mg by mouth every 8 hours as needed, Disp: , Rfl:     aspirin (ASA) 81 MG EC tablet, Take 81 mg by mouth daily, Disp: , Rfl:     brimonidine (ALPHAGAN-P) 0.15 % ophthalmic solution, Apply 1 drop to eye, Disp: , Rfl:     budesonide-formoterol (SYMBICORT) 160-4.5 MCG/ACT Inhaler, Inhale 2 puffs into the lungs as needed, Disp: , Rfl:      losartan (COZAAR) 50 MG tablet, Take 1 tablet by mouth daily, Disp: , Rfl:     metoprolol succinate ER (TOPROL XL) 25 MG 24 hr tablet, Take by mouth every 12 hours, Disp: , Rfl:     Multiple Vitamin (MULTIVITAMIN ADULT) TABS, Take 1 tablet by mouth daily, Disp: , Rfl:     omeprazole (PRILOSEC) 20 MG DR capsule, Take 20 mg by mouth 2 times daily, Disp: , Rfl:     ondansetron (ZOFRAN ODT) 4 MG ODT tab, Take 4 mg by mouth every 6 hours as needed, Disp: , Rfl:     rosuvastatin (CRESTOR) 20 MG tablet, Take 20 mg by mouth At Bedtime, Disp: , Rfl:     triamcinolone (KENALOG) 0.1 % external cream, Apply a thin layer to the face twice daily for 2 weeks on and 2 weeks off., Disp: , Rfl:     Vitamin D3 (VITAMIN D, CHOLECALCIFEROL,) 25 mcg (1000 units) tablet, Take 1 tablet by mouth daily, Disp: , Rfl:     ALLERGIES: Augmentin [amoxicillin-pot clavulanate], Clavulanic acid, and Sulfa antibiotics    SOCIAL HISTORY:    Social History     Socioeconomic History    Marital status:      Spouse name: Not on file    Number of children: Not on file    Years of education: Not on file    Highest education level: Not on file   Occupational History    Not on file   Tobacco Use    Smoking status: Never    Smokeless tobacco: Never   Vaping Use    Vaping Use: Never used   Substance and Sexual Activity    Alcohol use: Not Currently    Drug use: Not Currently    Sexual activity: Not on file   Other Topics Concern    Not on file   Social History Narrative    Not on file     Social Determinants of Health     Financial Resource Strain: Not on file   Food Insecurity: Not on file   Transportation Needs: Not on file   Physical Activity: Not on file   Stress: Not on file   Social Connections: Not on file   Interpersonal Safety: Not on file   Housing Stability: Not on file         FAMILY HISTORY: No family history on file.   Non-contributory for problems with anesthesia    REVIEW OF SYSTEMS:   The patient was asked a 14 point review of systems  regarding constitutional symptoms, eye symptoms, ears, nose, mouth, throat symptoms, cardiovascular symptoms, respiratory symptoms, gastrointestinal symptoms, genitourinary symptoms, musculoskeletal symptoms, integumentary symptoms, neurological symptoms, psychiatric symptoms, endocrine symptoms, hematologic/lymphatic symptoms, and allergic/ immunologic symptoms.   The pertinent factors have been included in the HPI and below.  Patient Supplied Answers to Review of Systems       No data to display                Physical Examination   The patient underwent a physical examination as described below. The pertinent positive and negative findings are summarized after the description of the examination.  Constitutional: The patient's developmental and nutritional status was assessed. The patient's voice quality was assessed.  Head and Face: The head and face were inspected for deformities. The sinuses were palpated. The salivary glands were palpated. Facial muscle strength was assessed bilaterally.  Eyes: Extraocular movements and primary gaze alignment were assessed.  Ears, Nose, Mouth and Throat: The ears and nose were examined for deformities. The nasal septum, mucosa, and turbinates were inspected by anterior rhinoscopy. The lips, teeth, and gums were examined for abnormalities. The oral mucosa, tongue, palate, tonsils, lateral and posterior pharynx were inspected for the presence of asymmetry or mucosal lesions.    Neck: The tracheal position was noted, and the neck mass palpated to determine if there were any asymmetries, abnormal neck masses, thyromegally, or thyroid nodules.  Respiratory: The nature of the breathing and chest expansion/symmetry was observed.  Cardiovascular: The patient was examined to determine the presence of any edema or jugular venous distension.  Abdomen: The contour of the abdomen was noted.  Lymphatic: The patient was examined for infraclavicular lymphadenopathy.  Musculoskeletal: The  patient was inspected for the presence of skeletal deformities.  Extremities: The extremities were examined for any clubbing or cyanosis.  Skin: The skin was examined for inflammatory or neoplastic conditions.  Neurologic: The patient's orientation, mood, and affect were noted. The cranial nerve  functions were examined.  Other pertinent positive and negative findings on physical examination:   OC/OP: no lesions, uvula midline, soft palate elevates symmetrically   Neck: no lesions, no TH tenderness to palpation  Healing incision   All other physical examination findings were within normal limits and noncontributory.    Procedures     Flexible laryngoscopy (CPT 60611)        Pre-procedure diagnosis: dysphonia   Post-procedure diagnosis: same as above  Indication for procedure: Mr. Guevara is a 67 year old male with see above  Procedure(s): Fiberoptic Laryngoscopy     Details of Procedure: After informed consent was obtained, the patient was seated in the examination chair.  The areas of the nasopharynx as well as the hypopharynx were anesthetized with topical 4% lidocaine with 0.25% phenylephrine atomizer.  Examination of the base of tongue was performed first.  Attention was directed to any evidence of masses in the area or evidence of leukoplakia or candidal infection.  Attention was directed to the epiglottis where its size and position was determined and its movement on phonation of the vowel  e .  The piriform sinuses were then inspected for any mass lesions or pooling of secretions.  Attention was then directed to the larynx. The vocal folds were inspected for infection or any areas of leukoplakia, for masses, polypoid degeneration, or hemorrhage.  Having done this, the arytenoids and vocal processes were inspected for erythema or evidence of granuloma formation.  The posterior commissure was then inspected for evidence of inflammatory changes in the mucosa and heaping up of mucosal tissue. The patient was then  instructed to say the vowel  e .  Adduction of vocal folds to the midline was observed for any evidence of paresis or paralysis of the larynx or asymmetry in rotation of the larynx to the left or right. The patient was asked to breathe and the degree of abduction was noted bilaterally.  Subglottic view of the larynx was obtained for any additional mass lesions or mucosal changes.  Finally the post cricoid was examined for evidence of pooling of secretions, as well as the pharyngeal wall mucosa.   Anesthesia type: 0.25% phenylephrine     Findings:  Anatomic/physiological deviations: RNC, bilateral vocal fold paralysis with severe vocal fold atrophy, left vocal fold lateralization stitch in place, left vocal fold is no longer swollen but still red               Right vocal process: Marked restriction of mobility   Left vocal process: Marked restriction of mobility  Glottal gap: Glottal gap  Supraglottic structures: Normal  Hypopharynx: Normal      Estimated Blood Loss: minimal  Complications: None  Disposition: Patient tolerated the procedure well       Fiberoptic Endoscopic Evaluation of Swallowing (CPT 41637)  and Interpretation of Swallowing (CPT 30235)     Indications: See above notes for complete history and physical. Patient complains of dysphagia to liquids and/or there is suggestion on history and endoscopic exam of the presence of dysphagia causing medical complaints.  Swallowing evaluation is being performed to assess the presence and degree of dysphagia, and to recommend a safe diet.     Pulmonary Status:        No PNA   Current Diet:                regular                                                    thin liquids      Consistency Amounts:  Thin Liquid: sip   Puree: 1 tsp  Solid: cookies            Positioning: upright in a chair  Oral Peripheral Exam: See physical exam section.  Anatomic Notes: See Videostroboscopy report for assessment of anatomy and laryngeal functioning  Pharyngeal secretions  prior to administration of liquid or food: Yes  Oral Phase Abnormal Findings: No abnormal behavior observed  Behavioral Adaptations: No abnormal behavior observed  Pharyngeal Phase Abnormal Findings: penetration with thins improved     Recommended Diet:  regular                                        thin liquids            Review of Relevant Clinical Data   I personally reviewed:     Labs:  No results found for: TSH  No results found for: NA, CO2, BUN, CREAT, GLUCOSE, PHOS  No results found for: WBC, HGB, HCT, MCV, PLT  No results found for: PT, PTT, INR  No results found for: AHSAN  No components found for: RHEUMATOIDFACTOR,  RF  No results found for: CRP  No components found for: CKTOT, URICACID  No components found for: C3, C4, DSDNAAB, NDNAABIFA  No results found for: MPOAB    Patient reported Quality of Life (QOL) Measures   Patient Supplied Answers To VHI Questionnaire       No data to display                  Patient Supplied Answers To EAT Questionnaire       No data to display                  Patient Supplied Answers To CSI Questionnaire       No data to display                  Patient Supplied Answers to Dyspnea Index Questionnaire:       No data to display                Impression & Plan     IMPRESSION: Mr. Guevara is a 67 year old male who is being seen for the following:    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   - history of sarcoidosis s/p lung biopsy with subsequent cough for 3 years   - has received right vocal fold augmentation through [facility] with benefit for about 2 weeks   - now voice is very weak and gets winded with voice use   - scope shows bilateral vocal fold paralysis with severe vocal fold atrophy  - FEES shows no penetration, no aspiration   - discussed etiology of vocal fold paralysis in this case is unclear   - discussed option of diagnostic MDL to evaluate breathing with evaluation of scar band formation and if not consideration of  suture lateralization or trach placement  - Reviewed CT chest with radiology. There are calcified nodes in mediastinum but do not explain reason for vocal fold paralysis.  - symptoms 5/30/2023 are stable  - discussed that vocal folds are too close together for additional filler, discussed options of diagnostic surgery to see how far vocal folds can be moved apart  - discussed if stenosis - if scar band ok to laser, if CA joint ankylosis, then options are a trach, if vocal fold paralysis - then consider vocal fold suture lateralization   - CT neck - no lesions - he still has CAHA in the right vocal fold  - diagnostic MDL with finding of left CA joint mobile, right CA joint immobile, no glottic scar band posteriorly 7/5/23  - symptoms 8/14/2023 are not improved, still difficulty with voicing  Patient seen today for laryngeal EMG  The left CT and TA showed fibrillation potentials and denervation changes  Feels that voice is improved now - likely due to some of the swelling from the needle  Discussed options of observation, amplifier or attempt at suture lateralization on the left with trial of augmentation after. Discussed risk of trach given his limited airway  Patient would like to proceed with intervention. He is using amplifier without much help.  S/p MDL with  left suture lateralization 7/5/23  - symptoms today 9/22/23 - has been vomiting and coughing with liquids  - s/p MDL with  left suture lateralization 7/5/23  - has been vomiting and coughing since surgery   - scope shows bilateral vocal fold paralysis with severe vocal fold atrophy, left vocal fold lateralization stitch in place, left vocal fold is swollen and red  - FEES shows penetration with thins   - symptoms 9/29/2023 are improved  - scope shows  bilateral vocal fold paralysis with severe vocal fold atrophy, left vocal fold lateralization stitch in place, left vocal fold is no longer swollen but still red  - FEES shows improved penetration  - discussed  his airway is still too small to attempt an augmentation  - options are observation, trach followed by filler, electrolarynx   - he will think about this   Plan  - swallow precautions  - will think about his options  - trial electrolaryxn      RETURN VISIT: 3-4 weeks    Keysha Leone MD    Laryngology    [facility]  Department of  Otolaryngology - Head and Neck Surgery  Clinics & Surgery Center  [address] [place] [address]  Appointment line: [phone]  Fax: [phone]  [email or web address]

## 2023-09-29 NOTE — PATIENT INSTRUCTIONS
1.  You were seen in the ENT Clinic today by . If you have any questions or concerns after your appointment, please call [phone]. Press option #1 for scheduling related needs. Press option #3 for Nurse advice.    2.  Plan is to return to clinic in 3-4 weeks      Nai Deal LPN  [phone]  [facility] - Otolaryngology

## 2023-09-29 NOTE — LETTER
2023       RE: Bandar Guevara  [address]  [facility] [address]     Dear Colleague,    Thank you for referring your patient, Bandar Guevara, to the [facility] [place] at [facility]. Please see a copy of my visit note below.        [facility]   at the [facility]   Otolaryngology Clinic     Patient: Bandar Guevara    MRN: [id number]    : 1955    Age/Gender: 67 year old male  Date of Service: 2023  Rendering Provider:   Keysha Leone MD     Chief Complaint   Bilateral vocal fold paralysis  Dysphonia  S/p MDL with  left suture lateralization 23  Interval History   HISTORY OF PRESENT ILLNESS: Mr. Guevara is a 67 year old male is being followed for dysphonia.   he was initially seen on 2023. Please refer to this note for full history.        Today, he presents for follow up. he reports:  - doing better in the last week     PAST MEDICAL HISTORY:   Past Medical History:   Diagnosis Date    Achalasia     Coronary artery disease     Dysphagia     Hypertension     Sarcoidosis     Stented coronary artery        PAST SURGICAL HISTORY:   Past Surgical History:   Procedure Laterality Date    ABDOMEN SURGERY      heller myotomy    BRONCHOSCOPY FLEXIBLE N/A 2023    Procedure: Bronchoscopy flexible;  Surgeon: Keysha Leone MD;  Location: [facility] OR    coronary artery stent placement N/A     ENDOBRONCHIAL ULTRASOUND      ESOPHAGOSCOPY      LARYNGOSCOPY WITH MICROSCOPE N/A 2023    Procedure: MICROLARYNGOSCOPY with suture lateralization;  Surgeon: Keysha Leone MD;  Location:  OR    LASER CO2 LARYNGOSCOPY N/A 2023    Procedure: MICROLARYNGOSCOPY, awake fiberoptic intubation, CO2 laser on standby;  Surgeon: Keysha Leone MD;  Location: [facility] OR    ORTHOPEDIC SURGERY         CURRENT MEDICATIONS:   Current Outpatient Medications:     acetaminophen (TYLENOL) 500 MG tablet, Take 1,000 mg by mouth every 8 hours  as needed, Disp: , Rfl:     aspirin (ASA) 81 MG EC tablet, Take 81 mg by mouth daily, Disp: , Rfl:     brimonidine (ALPHAGAN-P) 0.15 % ophthalmic solution, Apply 1 drop to eye, Disp: , Rfl:     budesonide-formoterol (SYMBICORT) 160-4.5 MCG/ACT Inhaler, Inhale 2 puffs into the lungs as needed, Disp: , Rfl:     losartan (COZAAR) 50 MG tablet, Take 1 tablet by mouth daily, Disp: , Rfl:     metoprolol succinate ER (TOPROL XL) 25 MG 24 hr tablet, Take by mouth every 12 hours, Disp: , Rfl:     Multiple Vitamin (MULTIVITAMIN ADULT) TABS, Take 1 tablet by mouth daily, Disp: , Rfl:     omeprazole (PRILOSEC) 20 MG DR capsule, Take 20 mg by mouth 2 times daily, Disp: , Rfl:     ondansetron (ZOFRAN ODT) 4 MG ODT tab, Take 4 mg by mouth every 6 hours as needed, Disp: , Rfl:     rosuvastatin (CRESTOR) 20 MG tablet, Take 20 mg by mouth At Bedtime, Disp: , Rfl:     triamcinolone (KENALOG) 0.1 % external cream, Apply a thin layer to the face twice daily for 2 weeks on and 2 weeks off., Disp: , Rfl:     Vitamin D3 (VITAMIN D, CHOLECALCIFEROL,) 25 mcg (1000 units) tablet, Take 1 tablet by mouth daily, Disp: , Rfl:     ALLERGIES: Augmentin [amoxicillin-pot clavulanate], Clavulanic acid, and Sulfa antibiotics    SOCIAL HISTORY:    Social History     Socioeconomic History    Marital status:      Spouse name: Not on file    Number of children: Not on file    Years of education: Not on file    Highest education level: Not on file   Occupational History    Not on file   Tobacco Use    Smoking status: Never    Smokeless tobacco: Never   Vaping Use    Vaping Use: Never used   Substance and Sexual Activity    Alcohol use: Not Currently    Drug use: Not Currently    Sexual activity: Not on file   Other Topics Concern    Not on file   Social History Narrative    Not on file     Social Determinants of Health     Financial Resource Strain: Not on file   Food Insecurity: Not on file   Transportation Needs: Not on file   Physical Activity:  Not on file   Stress: Not on file   Social Connections: Not on file   Interpersonal Safety: Not on file   Housing Stability: Not on file         FAMILY HISTORY: No family history on file.   Non-contributory for problems with anesthesia    REVIEW OF SYSTEMS:   The patient was asked a 14 point review of systems regarding constitutional symptoms, eye symptoms, ears, nose, mouth, throat symptoms, cardiovascular symptoms, respiratory symptoms, gastrointestinal symptoms, genitourinary symptoms, musculoskeletal symptoms, integumentary symptoms, neurological symptoms, psychiatric symptoms, endocrine symptoms, hematologic/lymphatic symptoms, and allergic/ immunologic symptoms.   The pertinent factors have been included in the HPI and below.  Patient Supplied Answers to Review of Systems       No data to display                Physical Examination   The patient underwent a physical examination as described below. The pertinent positive and negative findings are summarized after the description of the examination.  Constitutional: The patient's developmental and nutritional status was assessed. The patient's voice quality was assessed.  Head and Face: The head and face were inspected for deformities. The sinuses were palpated. The salivary glands were palpated. Facial muscle strength was assessed bilaterally.  Eyes: Extraocular movements and primary gaze alignment were assessed.  Ears, Nose, Mouth and Throat: The ears and nose were examined for deformities. The nasal septum, mucosa, and turbinates were inspected by anterior rhinoscopy. The lips, teeth, and gums were examined for abnormalities. The oral mucosa, tongue, palate, tonsils, lateral and posterior pharynx were inspected for the presence of asymmetry or mucosal lesions.    Neck: The tracheal position was noted, and the neck mass palpated to determine if there were any asymmetries, abnormal neck masses, thyromegally, or thyroid nodules.  Respiratory: The nature of the  breathing and chest expansion/symmetry was observed.  Cardiovascular: The patient was examined to determine the presence of any edema or jugular venous distension.  Abdomen: The contour of the abdomen was noted.  Lymphatic: The patient was examined for infraclavicular lymphadenopathy.  Musculoskeletal: The patient was inspected for the presence of skeletal deformities.  Extremities: The extremities were examined for any clubbing or cyanosis.  Skin: The skin was examined for inflammatory or neoplastic conditions.  Neurologic: The patient's orientation, mood, and affect were noted. The cranial nerve  functions were examined.  Other pertinent positive and negative findings on physical examination:   OC/OP: no lesions, uvula midline, soft palate elevates symmetrically   Neck: no lesions, no TH tenderness to palpation  Healing incision   All other physical examination findings were within normal limits and noncontributory.    Procedures     Flexible laryngoscopy (CPT 68343)        Pre-procedure diagnosis: dysphonia   Post-procedure diagnosis: same as above  Indication for procedure: Mr. Guevara is a 67 year old male with see above  Procedure(s): Fiberoptic Laryngoscopy     Details of Procedure: After informed consent was obtained, the patient was seated in the examination chair.  The areas of the nasopharynx as well as the hypopharynx were anesthetized with topical 4% lidocaine with 0.25% phenylephrine atomizer.  Examination of the base of tongue was performed first.  Attention was directed to any evidence of masses in the area or evidence of leukoplakia or candidal infection.  Attention was directed to the epiglottis where its size and position was determined and its movement on phonation of the vowel  e .  The piriform sinuses were then inspected for any mass lesions or pooling of secretions.  Attention was then directed to the larynx. The vocal folds were inspected for infection or any areas of leukoplakia, for masses,  polypoid degeneration, or hemorrhage.  Having done this, the arytenoids and vocal processes were inspected for erythema or evidence of granuloma formation.  The posterior commissure was then inspected for evidence of inflammatory changes in the mucosa and heaping up of mucosal tissue. The patient was then instructed to say the vowel  e .  Adduction of vocal folds to the midline was observed for any evidence of paresis or paralysis of the larynx or asymmetry in rotation of the larynx to the left or right. The patient was asked to breathe and the degree of abduction was noted bilaterally.  Subglottic view of the larynx was obtained for any additional mass lesions or mucosal changes.  Finally the post cricoid was examined for evidence of pooling of secretions, as well as the pharyngeal wall mucosa.   Anesthesia type: 0.25% phenylephrine     Findings:  Anatomic/physiological deviations: RNC, bilateral vocal fold paralysis with severe vocal fold atrophy, left vocal fold lateralization stitch in place, left vocal fold is no longer swollen but still red               Right vocal process: Marked restriction of mobility   Left vocal process: Marked restriction of mobility  Glottal gap: Glottal gap  Supraglottic structures: Normal  Hypopharynx: Normal      Estimated Blood Loss: minimal  Complications: None  Disposition: Patient tolerated the procedure well       Fiberoptic Endoscopic Evaluation of Swallowing (CPT 57745)  and Interpretation of Swallowing (CPT 12399)     Indications: See above notes for complete history and physical. Patient complains of dysphagia to liquids and/or there is suggestion on history and endoscopic exam of the presence of dysphagia causing medical complaints.  Swallowing evaluation is being performed to assess the presence and degree of dysphagia, and to recommend a safe diet.     Pulmonary Status:        No PNA   Current Diet:                regular                                                     thin liquids      Consistency Amounts:  Thin Liquid: sip   Puree: 1 tsp  Solid: cookies            Positioning: upright in a chair  Oral Peripheral Exam: See physical exam section.  Anatomic Notes: See Videostroboscopy report for assessment of anatomy and laryngeal functioning  Pharyngeal secretions prior to administration of liquid or food: Yes  Oral Phase Abnormal Findings: No abnormal behavior observed  Behavioral Adaptations: No abnormal behavior observed  Pharyngeal Phase Abnormal Findings: penetration with thins improved     Recommended Diet:  regular                                        thin liquids            Review of Relevant Clinical Data   I personally reviewed:     Labs:  No results found for: TSH  No results found for: NA, CO2, BUN, CREAT, GLUCOSE, PHOS  No results found for: WBC, HGB, HCT, MCV, PLT  No results found for: PT, PTT, INR  No results found for: AHSAN  No components found for: RHEUMATOIDFACTOR,  RF  No results found for: CRP  No components found for: CKTOT, URICACID  No components found for: C3, C4, DSDNAAB, NDNAABIFA  No results found for: MPOAB    Patient reported Quality of Life (QOL) Measures   Patient Supplied Answers To VHI Questionnaire       No data to display                  Patient Supplied Answers To EAT Questionnaire       No data to display                  Patient Supplied Answers To CSI Questionnaire       No data to display                  Patient Supplied Answers to Dyspnea Index Questionnaire:       No data to display                Impression & Plan     IMPRESSION: Mr. Guevara is a 67 year old male who is being seen for the following:    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   - history of sarcoidosis s/p lung biopsy with subsequent cough for 3 years   - has received right vocal fold augmentation through [facility] with benefit for about 2 weeks   - now voice is very weak and gets winded with voice use   - scope shows  bilateral vocal fold paralysis with severe vocal fold atrophy  - FEES shows no penetration, no aspiration   - discussed etiology of vocal fold paralysis in this case is unclear   - discussed option of diagnostic MDL to evaluate breathing with evaluation of scar band formation and if not consideration of suture lateralization or trach placement  - Reviewed CT chest with radiology. There are calcified nodes in mediastinum but do not explain reason for vocal fold paralysis.  - symptoms 5/30/2023 are stable  - discussed that vocal folds are too close together for additional filler, discussed options of diagnostic surgery to see how far vocal folds can be moved apart  - discussed if stenosis - if scar band ok to laser, if CA joint ankylosis, then options are a trach, if vocal fold paralysis - then consider vocal fold suture lateralization   - CT neck - no lesions - he still has CAHA in the right vocal fold  - diagnostic MDL with finding of left CA joint mobile, right CA joint immobile, no glottic scar band posteriorly 7/5/23  - symptoms 8/14/2023 are not improved, still difficulty with voicing  Patient seen today for laryngeal EMG  The left CT and TA showed fibrillation potentials and denervation changes  Feels that voice is improved now - likely due to some of the swelling from the needle  Discussed options of observation, amplifier or attempt at suture lateralization on the left with trial of augmentation after. Discussed risk of trach given his limited airway  Patient would like to proceed with intervention. He is using amplifier without much help.  S/p MDL with  left suture lateralization 7/5/23  - symptoms today 9/22/23 - has been vomiting and coughing with liquids  - s/p MDL with  left suture lateralization 7/5/23  - has been vomiting and coughing since surgery   - scope shows bilateral vocal fold paralysis with severe vocal fold atrophy, left vocal fold lateralization stitch in place, left vocal fold is swollen  and red  - FEES shows penetration with thins   - symptoms 9/29/2023 are improved  - scope shows  bilateral vocal fold paralysis with severe vocal fold atrophy, left vocal fold lateralization stitch in place, left vocal fold is no longer swollen but still red  - FEES shows improved penetration  - discussed his airway is still too small to attempt an augmentation  - options are observation, trach followed by filler, electrolarynx   - he will think about this   Plan  - swallow precautions  - will think about his options  - trial electrolaryxn      RETURN VISIT: 3-4 weeks    Keysha Leone MD    Laryngology    [facility]  Department of  Otolaryngology - Head and Neck Surgery  Clinics & Surgery Center  [address] [place] [address]  Appointment line: [phone]  Fax: [phone]  [email or web address]

## 2023-11-02 ENCOUNTER — THERAPY VISIT (OUTPATIENT)
Dept: SPEECH THERAPY | Facility: CLINIC | Age: 68
End: 2023-11-02
Payer: COMMERCIAL

## 2023-11-02 ENCOUNTER — OFFICE VISIT (OUTPATIENT)
Dept: OTOLARYNGOLOGY | Facility: CLINIC | Age: 68
End: 2023-11-02
Payer: COMMERCIAL

## 2023-11-02 VITALS
WEIGHT: 142 LBS | BODY MASS INDEX: 21.03 KG/M2 | OXYGEN SATURATION: 97 % | DIASTOLIC BLOOD PRESSURE: 79 MMHG | HEIGHT: 69 IN | HEART RATE: 72 BPM | SYSTOLIC BLOOD PRESSURE: 126 MMHG

## 2023-11-02 DIAGNOSIS — R49.0 DYSPHONIA: ICD-10-CM

## 2023-11-02 DIAGNOSIS — R13.12 OROPHARYNGEAL DYSPHAGIA: ICD-10-CM

## 2023-11-02 DIAGNOSIS — R13.10 DYSPHAGIA, UNSPECIFIED TYPE: Primary | ICD-10-CM

## 2023-11-02 DIAGNOSIS — J38.02 BILATERAL VOCAL FOLD PARALYSIS: Primary | ICD-10-CM

## 2023-11-02 PROCEDURE — 92526 ORAL FUNCTION THERAPY: CPT | Mod: GN | Performed by: SPEECH-LANGUAGE PATHOLOGIST

## 2023-11-02 PROCEDURE — 99213 OFFICE O/P EST LOW 20 MIN: CPT | Mod: 25 | Performed by: OTOLARYNGOLOGY

## 2023-11-02 PROCEDURE — 92613 ENDOSCOPY SWALLOW (FEES) I&R: CPT | Performed by: OTOLARYNGOLOGY

## 2023-11-02 PROCEDURE — 92612 ENDOSCOPY SWALLOW (FEES) VID: CPT | Mod: GN | Performed by: OTOLARYNGOLOGY

## 2023-11-02 ASSESSMENT — PAIN SCALES - GENERAL: PAINLEVEL: NO PAIN (0)

## 2023-11-02 NOTE — LETTER
2023       RE: Bandar Guevara  [address]  [facility] [address]     Dear Colleague,    Thank you for referring your patient, Bandar Guevara, to the [facility] [place] at [facility]. Please see a copy of my visit note below.        [facility]   at the [facility]   Otolaryngology Clinic     Patient: Bandar Guevara    MRN: [id number]    : 1955    Age/Gender: 67 year old male  Date of Service: 2023  Rendering Provider:   Keysha Leone MD     Chief Complaint   Bilateral vocal fold paralysis  Dysphonia  S/p MDL with  left suture lateralization 23  Interval History   HISTORY OF PRESENT ILLNESS: Mr. Guevara is a 67 year old male is being followed for dysphonia.   he was initially seen on 2023. Please refer to this note for full history.     Today, he presents for follow up. he reports:  - he is unsure if breathing is any better  - he has been vomiting once a week, dependent on what he eats  - still coughing with liquids     PAST MEDICAL HISTORY:   Past Medical History:   Diagnosis Date    Achalasia     Coronary artery disease     Dysphagia     Hypertension     Sarcoidosis     Stented coronary artery        PAST SURGICAL HISTORY:   Past Surgical History:   Procedure Laterality Date    ABDOMEN SURGERY      heller myotomy    BRONCHOSCOPY FLEXIBLE N/A 2023    Procedure: Bronchoscopy flexible;  Surgeon: Keysha Leone MD;  Location:  OR    coronary artery stent placement N/A     ENDOBRONCHIAL ULTRASOUND      ESOPHAGOSCOPY      LARYNGOSCOPY WITH MICROSCOPE N/A 2023    Procedure: MICROLARYNGOSCOPY with suture lateralization;  Surgeon: Keysha Leone MD;  Location:  OR    LASER CO2 LARYNGOSCOPY N/A 2023    Procedure: MICROLARYNGOSCOPY, awake fiberoptic intubation, CO2 laser on standby;  Surgeon: Keysha Leone MD;  Location: [facility] OR    ORTHOPEDIC SURGERY         CURRENT MEDICATIONS:   Current  Outpatient Medications:     acetaminophen (TYLENOL) 500 MG tablet, Take 1,000 mg by mouth every 8 hours as needed, Disp: , Rfl:     aspirin (ASA) 81 MG EC tablet, Take 81 mg by mouth daily, Disp: , Rfl:     brimonidine (ALPHAGAN-P) 0.15 % ophthalmic solution, Apply 1 drop to eye, Disp: , Rfl:     budesonide-formoterol (SYMBICORT) 160-4.5 MCG/ACT Inhaler, Inhale 2 puffs into the lungs as needed, Disp: , Rfl:     losartan (COZAAR) 50 MG tablet, Take 1 tablet by mouth daily, Disp: , Rfl:     metoprolol succinate ER (TOPROL XL) 25 MG 24 hr tablet, Take by mouth every 12 hours, Disp: , Rfl:     Multiple Vitamin (MULTIVITAMIN ADULT) TABS, Take 1 tablet by mouth daily, Disp: , Rfl:     omeprazole (PRILOSEC) 20 MG DR capsule, Take 20 mg by mouth 2 times daily, Disp: , Rfl:     ondansetron (ZOFRAN ODT) 4 MG ODT tab, Take 4 mg by mouth every 6 hours as needed, Disp: , Rfl:     rosuvastatin (CRESTOR) 20 MG tablet, Take 20 mg by mouth At Bedtime, Disp: , Rfl:     triamcinolone (KENALOG) 0.1 % external cream, Apply a thin layer to the face twice daily for 2 weeks on and 2 weeks off., Disp: , Rfl:     Vitamin D3 (VITAMIN D, CHOLECALCIFEROL,) 25 mcg (1000 units) tablet, Take 1 tablet by mouth daily, Disp: , Rfl:     ALLERGIES: Augmentin [amoxicillin-pot clavulanate], Clavulanic acid, and Sulfa antibiotics    SOCIAL HISTORY:    Social History     Socioeconomic History    Marital status:      Spouse name: Not on file    Number of children: Not on file    Years of education: Not on file    Highest education level: Not on file   Occupational History    Not on file   Tobacco Use    Smoking status: Never    Smokeless tobacco: Never   Vaping Use    Vaping Use: Never used   Substance and Sexual Activity    Alcohol use: Not Currently    Drug use: Not Currently    Sexual activity: Not on file   Other Topics Concern    Not on file   Social History Narrative    Not on file     Social Determinants of Health     Financial Resource  Strain: Not on file   Food Insecurity: Not on file   Transportation Needs: Not on file   Physical Activity: Not on file   Stress: Not on file   Social Connections: Not on file   Interpersonal Safety: Not on file   Housing Stability: Not on file         FAMILY HISTORY: No family history on file.   Non-contributory for problems with anesthesia    REVIEW OF SYSTEMS:   The patient was asked a 14 point review of systems regarding constitutional symptoms, eye symptoms, ears, nose, mouth, throat symptoms, cardiovascular symptoms, respiratory symptoms, gastrointestinal symptoms, genitourinary symptoms, musculoskeletal symptoms, integumentary symptoms, neurological symptoms, psychiatric symptoms, endocrine symptoms, hematologic/lymphatic symptoms, and allergic/ immunologic symptoms.   The pertinent factors have been included in the HPI and below.  Patient Supplied Answers to Review of Systems       No data to display                Physical Examination   The patient underwent a physical examination as described below. The pertinent positive and negative findings are summarized after the description of the examination.  Constitutional: The patient's developmental and nutritional status was assessed. The patient's voice quality was assessed.  Head and Face: The head and face were inspected for deformities. The sinuses were palpated. The salivary glands were palpated. Facial muscle strength was assessed bilaterally.  Eyes: Extraocular movements and primary gaze alignment were assessed.  Ears, Nose, Mouth and Throat: The ears and nose were examined for deformities. The nasal septum, mucosa, and turbinates were inspected by anterior rhinoscopy. The lips, teeth, and gums were examined for abnormalities. The oral mucosa, tongue, palate, tonsils, lateral and posterior pharynx were inspected for the presence of asymmetry or mucosal lesions.    Neck: The tracheal position was noted, and the neck mass palpated to determine if there  were any asymmetries, abnormal neck masses, thyromegally, or thyroid nodules.  Respiratory: The nature of the breathing and chest expansion/symmetry was observed.  Cardiovascular: The patient was examined to determine the presence of any edema or jugular venous distension.  Abdomen: The contour of the abdomen was noted.  Lymphatic: The patient was examined for infraclavicular lymphadenopathy.  Musculoskeletal: The patient was inspected for the presence of skeletal deformities.  Extremities: The extremities were examined for any clubbing or cyanosis.  Skin: The skin was examined for inflammatory or neoplastic conditions.  Neurologic: The patient's orientation, mood, and affect were noted. The cranial nerve  functions were examined.  Other pertinent positive and negative findings on physical examination:   OC/OP: no lesions, uvula midline, soft palate elevates symmetrically   Neck: no lesions, no TH tenderness to palpation  Healing incision   All other physical examination findings were within normal limits and noncontributory.    Procedures   Flexible laryngoscopy (CPT 49707)        Pre-procedure diagnosis: dysphonia   Post-procedure diagnosis: same as above  Indication for procedure: Mr. Guevara is a 67 year old male with see above  Procedure(s): Fiberoptic Laryngoscopy     Details of Procedure: After informed consent was obtained, the patient was seated in the examination chair.  The areas of the nasopharynx as well as the hypopharynx were anesthetized with topical 4% lidocaine with 0.25% phenylephrine atomizer.  Examination of the base of tongue was performed first.  Attention was directed to any evidence of masses in the area or evidence of leukoplakia or candidal infection.  Attention was directed to the epiglottis where its size and position was determined and its movement on phonation of the vowel  e .  The piriform sinuses were then inspected for any mass lesions or pooling of secretions.  Attention was then  directed to the larynx. The vocal folds were inspected for infection or any areas of leukoplakia, for masses, polypoid degeneration, or hemorrhage.  Having done this, the arytenoids and vocal processes were inspected for erythema or evidence of granuloma formation.  The posterior commissure was then inspected for evidence of inflammatory changes in the mucosa and heaping up of mucosal tissue. The patient was then instructed to say the vowel  e .  Adduction of vocal folds to the midline was observed for any evidence of paresis or paralysis of the larynx or asymmetry in rotation of the larynx to the left or right. The patient was asked to breathe and the degree of abduction was noted bilaterally.  Subglottic view of the larynx was obtained for any additional mass lesions or mucosal changes.  Finally the post cricoid was examined for evidence of pooling of secretions, as well as the pharyngeal wall mucosa.   Anesthesia type: 0.25% phenylephrine     Findings:  Anatomic/physiological deviations: RNC, bilateral vocal fold paralysis with severe vocal fold atrophy, left vocal fold lateralization stitch in place, left vocal fold erythema is improved, airway is larger now               Right vocal process: Marked restriction of mobility   Left vocal process: Marked restriction of mobility  Glottal gap: Glottal gap  Supraglottic structures: Normal  Hypopharynx: Normal      Estimated Blood Loss: minimal  Complications: None  Disposition: Patient tolerated the procedure well      May exam    Sept exam        Fiberoptic Endoscopic Evaluation of Swallowing (CPT 19360)  and Interpretation of Swallowing (CPT 42020)     Indications: See above notes for complete history and physical. Patient complains of dysphagia to liquids and/or there is suggestion on history and endoscopic exam of the presence of dysphagia causing medical complaints.  Swallowing evaluation is being performed to assess the presence and degree of dysphagia, and to  "recommend a safe diet.     Pulmonary Status:        No PNA   Current Diet:                regular                                                    thin liquids      Consistency Amounts:  Thin Liquid: sip   Puree: 1 tsp  Solid: cookies            Positioning: upright in a chair  Oral Peripheral Exam: See physical exam section.  Anatomic Notes: See Videostroboscopy report for assessment of anatomy and laryngeal functioning  Pharyngeal secretions prior to administration of liquid or food: Yes  Oral Phase Abnormal Findings: No abnormal behavior observed  Behavioral Adaptations: No abnormal behavior observed  Pharyngeal Phase Abnormal Findings: mild penetration with thins      Recommended Diet:  regular                                        thin liquids       Review of Relevant Clinical Data   I personally reviewed:    Labs:  No results found for: \"TSH\"  No results found for: \"NA\", \"CO2\", \"BUN\", \"CREAT\", \"GLUCOSE\", \"PHOS\"  No results found for: \"WBC\", \"HGB\", \"HCT\", \"MCV\", \"PLT\"  No results found for: \"PT\", \"PTT\", \"INR\"  No results found for: \"AHSAN\"  No components found for: \"RHEUMATOIDFACTOR\", \"RF\"  No results found for: \"CRP\"  No components found for: \"CKTOT\", \"URICACID\"  No components found for: \"C3\", \"C4\", \"DSDNAAB\", \"NDNAABIFA\"  No results found for: \"MPOAB\"    Patient reported Quality of Life (QOL) Measures   Patient Supplied Answers To VHI Questionnaire       No data to display                  Patient Supplied Answers To EAT Questionnaire       No data to display                  Patient Supplied Answers To CSI Questionnaire       No data to display                  Patient Supplied Answers to Dyspnea Index Questionnaire:       No data to display                Impression & Plan     IMPRESSION: Mr. Guevara is a 67 year old male who is being seen for the following:  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   - history of sarcoidosis s/p lung biopsy with subsequent cough " for 3 years   - has received right vocal fold augmentation through [facility] with benefit for about 2 weeks   - now voice is very weak and gets winded with voice use   - scope shows bilateral vocal fold paralysis with severe vocal fold atrophy  - FEES shows no penetration, no aspiration   - discussed etiology of vocal fold paralysis in this case is unclear   - discussed option of diagnostic MDL to evaluate breathing with evaluation of scar band formation and if not consideration of suture lateralization or trach placement  - Reviewed CT chest with radiology. There are calcified nodes in mediastinum but do not explain reason for vocal fold paralysis.  - symptoms 5/30/2023 are stable  - discussed that vocal folds are too close together for additional filler, discussed options of diagnostic surgery to see how far vocal folds can be moved apart  - discussed if stenosis - if scar band ok to laser, if CA joint ankylosis, then options are a trach, if vocal fold paralysis - then consider vocal fold suture lateralization   - CT neck - no lesions - he still has CAHA in the right vocal fold  - diagnostic MDL with finding of left CA joint mobile, right CA joint immobile, no glottic scar band posteriorly 7/5/23  - symptoms 8/14/2023 are not improved, still difficulty with voicing  Patient seen today for laryngeal EMG  The left CT and TA showed fibrillation potentials and denervation changes  Feels that voice is improved now - likely due to some of the swelling from the needle  Discussed options of observation, amplifier or attempt at suture lateralization on the left with trial of augmentation after. Discussed risk of trach given his limited airway  Patient would like to proceed with intervention. He is using amplifier without much help.  S/p MDL with  left suture lateralization 7/5/23  - symptoms today 9/22/23 - has been vomiting and coughing with liquids  - s/p MDL with  left suture lateralization 7/5/23  - has been vomiting  and coughing since surgery   - scope shows bilateral vocal fold paralysis with severe vocal fold atrophy, left vocal fold lateralization stitch in place, left vocal fold is swollen and red  - FEES shows penetration with thins   - symptoms 9/29/2023 are improved  - scope shows  bilateral vocal fold paralysis with severe vocal fold atrophy, left vocal fold lateralization stitch in place, left vocal fold is no longer swollen but still red  - FEES shows improved penetration  - discussed his airway is still too small to attempt an augmentation  - options are observation, trach followed by filler, electrolarynx   - he will think about this   - symptoms 11/2/2023 are stable  - scope shows bilateral vocal fold paralysis with severe vocal fold atrophy, left vocal fold lateralization stitch in place, left vocal fold erythema is improved  - FEES mild penetration with thins  - we talked that the options are limited for his voice, his airway is still small so adding filler has a high risk of stridor and breathing difficulty   - also discussed that a trach would be difficult given his coughing and vomiting from his GI issues  - trial electrolarynx today, will let me know after the trial what he thinks  Plan  - continue swallow precautions  - observation  - trial electrolarynx     RETURN VISIT: as needed after trial     Scribe Disclosure:   I, DANTE BRIGHT, am serving as a scribe; to document services personally performed by Keysha Leone MD -based on data collection and the provider's statements to me.     Provider Disclosure:  I agree with above History, Review of Systems, Physical exam and Plan.  I have reviewed the content of the documentation and have edited it as needed. I have personally performed the services documented here and the documentation accurately represents those services and the decisions I have made.      Electronically signed by:  Keysha Leone MD    Laryngology    [facility]  Department of  Otolaryngology - Head and Neck Surgery  Clinics & [facility] Center  [address] [place] [id number]  Appointment line: [phone]  Fax: [phone]  [email or web address]

## 2023-11-02 NOTE — PATIENT INSTRUCTIONS
1.  You were seen in the ENT Clinic today by Dr. Leone. If you have any questions or concerns after your appointment, please call [phone]. Press option #1 for scheduling related needs. Press option #3 for Nurse advice.    2.  Dr. Leone has recommended the following:   - trial electo-larynx     3.  Plan is to return to clinic as needed after trial     Vickie Muñoz  Please call [phone] with any questions or concerns  [facility] - Otolaryngology

## 2023-11-02 NOTE — PROGRESS NOTES
[facility]   at the [facility]   Otolaryngology Clinic     Patient: Bandar Guevara    MRN: [id number]    : 1955    Age/Gender: 67 year old male  Date of Service: 2023  Rendering Provider:   Keysha Leone MD     Chief Complaint   Bilateral vocal fold paralysis  Dysphonia  S/p MDL with  left suture lateralization 23  Interval History   HISTORY OF PRESENT ILLNESS: Mr. Guevara is a 67 year old male is being followed for dysphonia.   he was initially seen on 2023. Please refer to this note for full history.     Today, he presents for follow up. he reports:  - he is unsure if breathing is any better  - he has been vomiting once a week, dependent on what he eats  - still coughing with liquids     PAST MEDICAL HISTORY:   Past Medical History:   Diagnosis Date    Achalasia     Coronary artery disease     Dysphagia     Hypertension     Sarcoidosis     Stented coronary artery        PAST SURGICAL HISTORY:   Past Surgical History:   Procedure Laterality Date    ABDOMEN SURGERY      heller myotomy    BRONCHOSCOPY FLEXIBLE N/A 2023    Procedure: Bronchoscopy flexible;  Surgeon: Keysha Leone MD;  Location: [facility] OR    coronary artery stent placement N/A     ENDOBRONCHIAL ULTRASOUND      ESOPHAGOSCOPY      LARYNGOSCOPY WITH MICROSCOPE N/A 2023    Procedure: MICROLARYNGOSCOPY with suture lateralization;  Surgeon: Keysha Leone MD;  Location: [facility] OR    LASER CO2 LARYNGOSCOPY N/A 2023    Procedure: MICROLARYNGOSCOPY, awake fiberoptic intubation, CO2 laser on standby;  Surgeon: Keysha Leone MD;  Location: [facility] OR    ORTHOPEDIC SURGERY         CURRENT MEDICATIONS:   Current Outpatient Medications:     acetaminophen (TYLENOL) 500 MG tablet, Take 1,000 mg by mouth every 8 hours as needed, Disp: , Rfl:     aspirin (ASA) 81 MG EC tablet, Take 81 mg by mouth daily, Disp: , Rfl:     brimonidine (ALPHAGAN-P) 0.15 % ophthalmic solution, Apply 1 drop to eye, Disp: , Rfl:     budesonide-formoterol  (SYMBICORT) 160-4.5 MCG/ACT Inhaler, Inhale 2 puffs into the lungs as needed, Disp: , Rfl:     losartan (COZAAR) 50 MG tablet, Take 1 tablet by mouth daily, Disp: , Rfl:     metoprolol succinate ER (TOPROL XL) 25 MG 24 hr tablet, Take by mouth every 12 hours, Disp: , Rfl:     Multiple Vitamin (MULTIVITAMIN ADULT) TABS, Take 1 tablet by mouth daily, Disp: , Rfl:     omeprazole (PRILOSEC) 20 MG DR capsule, Take 20 mg by mouth 2 times daily, Disp: , Rfl:     ondansetron (ZOFRAN ODT) 4 MG ODT tab, Take 4 mg by mouth every 6 hours as needed, Disp: , Rfl:     rosuvastatin (CRESTOR) 20 MG tablet, Take 20 mg by mouth At Bedtime, Disp: , Rfl:     triamcinolone (KENALOG) 0.1 % external cream, Apply a thin layer to the face twice daily for 2 weeks on and 2 weeks off., Disp: , Rfl:     Vitamin D3 (VITAMIN D, CHOLECALCIFEROL,) 25 mcg (1000 units) tablet, Take 1 tablet by mouth daily, Disp: , Rfl:     ALLERGIES: Augmentin [amoxicillin-pot clavulanate], Clavulanic acid, and Sulfa antibiotics    SOCIAL HISTORY:    Social History     Socioeconomic History    Marital status:      Spouse name: Not on file    Number of children: Not on file    Years of education: Not on file    Highest education level: Not on file   Occupational History    Not on file   Tobacco Use    Smoking status: Never    Smokeless tobacco: Never   Vaping Use    Vaping Use: Never used   Substance and Sexual Activity    Alcohol use: Not Currently    Drug use: Not Currently    Sexual activity: Not on file   Other Topics Concern    Not on file   Social History Narrative    Not on file     Social Determinants of Health     Financial Resource Strain: Not on file   Food Insecurity: Not on file   Transportation Needs: Not on file   Physical Activity: Not on file   Stress: Not on file   Social Connections: Not on file   Interpersonal Safety: Not on file   Housing Stability: Not on file         FAMILY HISTORY: No family history on file.   Non-contributory for  problems with anesthesia    REVIEW OF SYSTEMS:   The patient was asked a 14 point review of systems regarding constitutional symptoms, eye symptoms, ears, nose, mouth, throat symptoms, cardiovascular symptoms, respiratory symptoms, gastrointestinal symptoms, genitourinary symptoms, musculoskeletal symptoms, integumentary symptoms, neurological symptoms, psychiatric symptoms, endocrine symptoms, hematologic/lymphatic symptoms, and allergic/ immunologic symptoms.   The pertinent factors have been included in the HPI and below.  Patient Supplied Answers to Review of Systems       No data to display                Physical Examination   The patient underwent a physical examination as described below. The pertinent positive and negative findings are summarized after the description of the examination.  Constitutional: The patient's developmental and nutritional status was assessed. The patient's voice quality was assessed.  Head and Face: The head and face were inspected for deformities. The sinuses were palpated. The salivary glands were palpated. Facial muscle strength was assessed bilaterally.  Eyes: Extraocular movements and primary gaze alignment were assessed.  Ears, Nose, Mouth and Throat: The ears and nose were examined for deformities. The nasal septum, mucosa, and turbinates were inspected by anterior rhinoscopy. The lips, teeth, and gums were examined for abnormalities. The oral mucosa, tongue, palate, tonsils, lateral and posterior pharynx were inspected for the presence of asymmetry or mucosal lesions.    Neck: The tracheal position was noted, and the neck mass palpated to determine if there were any asymmetries, abnormal neck masses, thyromegally, or thyroid nodules.  Respiratory: The nature of the breathing and chest expansion/symmetry was observed.  Cardiovascular: The patient was examined to determine the presence of any edema or jugular venous distension.  Abdomen: The contour of the abdomen was  noted.  Lymphatic: The patient was examined for infraclavicular lymphadenopathy.  Musculoskeletal: The patient was inspected for the presence of skeletal deformities.  Extremities: The extremities were examined for any clubbing or cyanosis.  Skin: The skin was examined for inflammatory or neoplastic conditions.  Neurologic: The patient's orientation, mood, and affect were noted. The cranial nerve  functions were examined.  Other pertinent positive and negative findings on physical examination:   OC/OP: no lesions, uvula midline, soft palate elevates symmetrically   Neck: no lesions, no TH tenderness to palpation  Healing incision   All other physical examination findings were within normal limits and noncontributory.    Procedures   Flexible laryngoscopy (CPT 77414)        Pre-procedure diagnosis: dysphonia   Post-procedure diagnosis: same as above  Indication for procedure: Mr. Guevara is a 67 year old male with see above  Procedure(s): Fiberoptic Laryngoscopy     Details of Procedure: After informed consent was obtained, the patient was seated in the examination chair.  The areas of the nasopharynx as well as the hypopharynx were anesthetized with topical 4% lidocaine with 0.25% phenylephrine atomizer.  Examination of the base of tongue was performed first.  Attention was directed to any evidence of masses in the area or evidence of leukoplakia or candidal infection.  Attention was directed to the epiglottis where its size and position was determined and its movement on phonation of the vowel  e .  The piriform sinuses were then inspected for any mass lesions or pooling of secretions.  Attention was then directed to the larynx. The vocal folds were inspected for infection or any areas of leukoplakia, for masses, polypoid degeneration, or hemorrhage.  Having done this, the arytenoids and vocal processes were inspected for erythema or evidence of granuloma formation.  The posterior commissure was then inspected for  evidence of inflammatory changes in the mucosa and heaping up of mucosal tissue. The patient was then instructed to say the vowel  e .  Adduction of vocal folds to the midline was observed for any evidence of paresis or paralysis of the larynx or asymmetry in rotation of the larynx to the left or right. The patient was asked to breathe and the degree of abduction was noted bilaterally.  Subglottic view of the larynx was obtained for any additional mass lesions or mucosal changes.  Finally the post cricoid was examined for evidence of pooling of secretions, as well as the pharyngeal wall mucosa.   Anesthesia type: 0.25% phenylephrine     Findings:  Anatomic/physiological deviations: RNC, bilateral vocal fold paralysis with severe vocal fold atrophy, left vocal fold lateralization stitch in place, left vocal fold erythema is improved, airway is larger now               Right vocal process: Marked restriction of mobility   Left vocal process: Marked restriction of mobility  Glottal gap: Glottal gap  Supraglottic structures: Normal  Hypopharynx: Normal      Estimated Blood Loss: minimal  Complications: None  Disposition: Patient tolerated the procedure well      May exam    Sept exam        Fiberoptic Endoscopic Evaluation of Swallowing (CPT 31534)  and Interpretation of Swallowing (CPT 93179)     Indications: See above notes for complete history and physical. Patient complains of dysphagia to liquids and/or there is suggestion on history and endoscopic exam of the presence of dysphagia causing medical complaints.  Swallowing evaluation is being performed to assess the presence and degree of dysphagia, and to recommend a safe diet.     Pulmonary Status:        No PNA   Current Diet:                regular                                                    thin liquids      Consistency Amounts:  Thin Liquid: sip   Puree: 1 tsp  Solid: cookies            Positioning: upright in a chair  Oral Peripheral Exam: See physical  "exam section.  Anatomic Notes: See Videostroboscopy report for assessment of anatomy and laryngeal functioning  Pharyngeal secretions prior to administration of liquid or food: Yes  Oral Phase Abnormal Findings: No abnormal behavior observed  Behavioral Adaptations: No abnormal behavior observed  Pharyngeal Phase Abnormal Findings: mild penetration with thins      Recommended Diet:  regular                                        thin liquids       Review of Relevant Clinical Data   I personally reviewed:    Labs:  No results found for: \"TSH\"  No results found for: \"NA\", \"CO2\", \"BUN\", \"CREAT\", \"GLUCOSE\", \"PHOS\"  No results found for: \"WBC\", \"HGB\", \"HCT\", \"MCV\", \"PLT\"  No results found for: \"PT\", \"PTT\", \"INR\"  No results found for: \"AHSAN\"  No components found for: \"RHEUMATOIDFACTOR\", \"RF\"  No results found for: \"CRP\"  No components found for: \"CKTOT\", \"URICACID\"  No components found for: \"C3\", \"C4\", \"DSDNAAB\", \"NDNAABIFA\"  No results found for: \"MPOAB\"    Patient reported Quality of Life (QOL) Measures   Patient Supplied Answers To VHI Questionnaire       No data to display                  Patient Supplied Answers To EAT Questionnaire       No data to display                  Patient Supplied Answers To CSI Questionnaire       No data to display                  Patient Supplied Answers to Dyspnea Index Questionnaire:       No data to display                Impression & Plan     IMPRESSION: Mr. Guevara is a 67 year old male who is being seen for the following:  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   - history of sarcoidosis s/p lung biopsy with subsequent cough for 3 years   - has received right vocal fold augmentation through [facility] with benefit for about 2 weeks   - now voice is very weak and gets winded with voice use   - scope shows bilateral vocal fold paralysis with severe vocal fold atrophy  - FEES shows no penetration, no aspiration   - discussed etiology of " vocal fold paralysis in this case is unclear   - discussed option of diagnostic MDL to evaluate breathing with evaluation of scar band formation and if not consideration of suture lateralization or trach placement  - Reviewed CT chest with radiology. There are calcified nodes in mediastinum but do not explain reason for vocal fold paralysis.  - symptoms 5/30/2023 are stable  - discussed that vocal folds are too close together for additional filler, discussed options of diagnostic surgery to see how far vocal folds can be moved apart  - discussed if stenosis - if scar band ok to laser, if CA joint ankylosis, then options are a trach, if vocal fold paralysis - then consider vocal fold suture lateralization   - CT neck - no lesions - he still has CAHA in the right vocal fold  - diagnostic MDL with finding of left CA joint mobile, right CA joint immobile, no glottic scar band posteriorly 7/5/23  - symptoms 8/14/2023 are not improved, still difficulty with voicing  Patient seen today for laryngeal EMG  The left CT and TA showed fibrillation potentials and denervation changes  Feels that voice is improved now - likely due to some of the swelling from the needle  Discussed options of observation, amplifier or attempt at suture lateralization on the left with trial of augmentation after. Discussed risk of trach given his limited airway  Patient would like to proceed with intervention. He is using amplifier without much help.  S/p MDL with  left suture lateralization 7/5/23  - symptoms today 9/22/23 - has been vomiting and coughing with liquids  - s/p MDL with  left suture lateralization 7/5/23  - has been vomiting and coughing since surgery   - scope shows bilateral vocal fold paralysis with severe vocal fold atrophy, left vocal fold lateralization stitch in place, left vocal fold is swollen and red  - FEES shows penetration with thins   - symptoms 9/29/2023 are improved  - scope shows  bilateral vocal fold paralysis with  severe vocal fold atrophy, left vocal fold lateralization stitch in place, left vocal fold is no longer swollen but still red  - FEES shows improved penetration  - discussed his airway is still too small to attempt an augmentation  - options are observation, trach followed by filler, electrolarynx   - he will think about this   - symptoms 11/2/2023 are stable  - scope shows bilateral vocal fold paralysis with severe vocal fold atrophy, left vocal fold lateralization stitch in place, left vocal fold erythema is improved  - FEES mild penetration with thins  - we talked that the options are limited for his voice, his airway is still small so adding filler has a high risk of stridor and breathing difficulty   - also discussed that a trach would be difficult given his coughing and vomiting from his GI issues  - trial electrolarynx today, will let me know after the trial what he thinks  Plan  - continue swallow precautions  - observation  - trial electrolarynx     RETURN VISIT: as needed after trial     Scribe Disclosure:   I, DANTE BRIGHT, am serving as a scribe; to document services personally performed by Keysha Leone MD -based on data collection and the provider's statements to me.     Provider Disclosure:  I agree with above History, Review of Systems, Physical exam and Plan.  I have reviewed the content of the documentation and have edited it as needed. I have personally performed the services documented here and the documentation accurately represents those services and the decisions I have made.      Electronically signed by:  Keysha Leone MD    Laryngology    [facility]  Department of  Otolaryngology - Head and Neck Surgery  Clinics & Surgery Center  [address] [place] [address]  Appointment line: [phone]  Fax: [phone]  [email or web address]

## 2023-11-22 ENCOUNTER — THERAPY VISIT (OUTPATIENT)
Dept: SPEECH THERAPY | Facility: CLINIC | Age: 68
End: 2023-11-22
Payer: COMMERCIAL

## 2023-11-22 DIAGNOSIS — R49.0 DYSPHONIA: Primary | ICD-10-CM

## 2023-11-22 DIAGNOSIS — R49.1 APHONIA: ICD-10-CM

## 2023-11-22 PROCEDURE — 92522 EVALUATE SPEECH PRODUCTION: CPT | Mod: GN | Performed by: SPEECH-LANGUAGE PATHOLOGIST

## 2023-11-22 PROCEDURE — 92507 TX SP LANG VOICE COMM INDIV: CPT | Mod: GN | Performed by: SPEECH-LANGUAGE PATHOLOGIST

## 2023-11-27 NOTE — PROGRESS NOTES
SPEECH LANGUAGE PATHOLOGY EVALUATION    See electronic medical record for Abuse and Falls Screening details.    Subjective      Presenting condition or subjective complaint:  alaryngeal voicing evaluation for use of electrolarynx in the setting of severe dysphonia  Date of onset: 11/22/23    Relevant medical history:   Bilateral vocal fold paralysis, sarcoidosis, heart disease, esophageal achalasia  Dates & types of surgery:  9/13/23: microlaryngoscopy with suture lateralization of vocal fold     Prior diagnostic imaging/testing results:     Mutliple endoscopic swallow assessments. Initially his swallow was safe however on most recent swallow evaluation from 9/22/23 he demonstrated   Prior therapy history for the same diagnosis, illness or injury:        Living Environment  Social support:   Lives with wife  Help at home:  Independent  Equipment owned:       Employment:    employed  Hobbies/Interests:      Patient goals for therapy:      Pain assessment: Pain denied     Objective       POST-OPERATIVE ASSESSMENT    Primary Communication Method: verbal communication using an amplification device due to aphonia  Oral Motor Structure Assessment: [facility]    ELECTROLARYNX  Model: Trutone Emote  Placement: neck   Instrument pitch: Mid range  Speech intelligibility:  90% to familiar listener  Factors impacting speech intelligibility: none    Assessment & Plan   CLINICAL IMPRESSIONS   Medical Diagnosis: bilateral vocal fold paralysis    Treatment Diagnosis: Aphonia/dysphonia   Impression/Assessment: Pt is a 67 year old male with aphonia and difficulty communicating complaints. The following significant findings have been identified: impaired communication, characterized by dysphonia which is improved with the use of an electrolarynx or amplification device. He is able to be understood at conversational levels with the electrolarynx as the amplification device does not help as much in noisy environments. Identified deficits  "interfere with their ability to communicate wants and needs and communicate within the home or community as compared to previous level of function.    PLAN OF CARE  Treatment Interventions: Communication    Prognosis to achieve stated therapy goals is excellent   Rehab potential is impacted by: comorbidities    Long Term Goals   SLP Goal 3  Goal Identifier: On/Off timing  Goal Description: Pt will demonstrate appropriate on/off timing at the phrase length during conversational tasks as judged by SLP.  Rationale: To maximize functional communication within the home or community  Target Date: 02/19/24  SLP Goal 4  Goal Identifier: Articulation  Goal Description: Pt will be 80% intelligible to untrained listener as judged by SLP during conversational tasks.  Rationale: To maximize functional communication within the home or community  Target Date: 02/19/24  SLP Goal 5  Goal Identifier: EL placement  Goal Description: Pt will demonstrate independent placement of the EL in the \"sweet spot\" during conversational tasks with untrained listener.  Rationale: To maximize functional communication within the home or community  Target Date: 02/19/24      Frequency of Treatment: 2-4x/month  Duration of Treatment: 3 months     Recommended Referrals to Other Professionals: none  Education Assessment:   Learner/Method: Patient;No Barriers to Learning    Risks and benefits of evaluation/treatment have been explained.   Patient/Family/caregiver agrees with Plan of Care.     Evaluation Time:    Sound production (artic, phonology, apraxia, dysarthria) Minutes (76497): 20    Signing Clinician: Uma Eugene, SLP    [facility]                                                                                   OUTPATIENT SPEECH LANGUAGE PATHOLOGY      PLAN OF TREATMENT FOR OUTPATIENT REHABILITATION   Patient's Last Name, First Name, VILLABandar Joel YOB: 1955   Provider's Name   [facility]   Medical Record No.  [id number]     Onset Date: 11/22/23 Start of Care Date: 09/22/23     Medical Diagnosis:  bilateral vocal fold paralysis      SLP Treatment Diagnosis: Aphonia/dysphonia  Plan of Treatment  Frequency/Duration: 2-4x/month  / 3 months     Certification date from 11/22/23   To 02/19/24          See note for plan of treatment details and functional goals     Uma Eugene, SLP                         I CERTIFY THE NEED FOR THESE SERVICES FURNISHED UNDER        THIS PLAN OF TREATMENT AND WHILE UNDER MY CARE     (Physician attestation of this document indicates review and certification of the therapy plan).              Referring Provider:  Dr. Keysha Leone    Initial Assessment  See Epic Evaluation- 09/22/23

## 2024-01-17 ENCOUNTER — THERAPY VISIT (OUTPATIENT)
Dept: SPEECH THERAPY | Facility: CLINIC | Age: 69
End: 2024-01-17
Payer: COMMERCIAL

## 2024-01-17 DIAGNOSIS — R49.1 APHONIA: ICD-10-CM

## 2024-01-17 DIAGNOSIS — R49.0 DYSPHONIA: Primary | ICD-10-CM

## 2024-01-17 DIAGNOSIS — R13.10 DYSPHAGIA, UNSPECIFIED TYPE: ICD-10-CM

## 2024-01-17 PROCEDURE — 92507 TX SP LANG VOICE COMM INDIV: CPT | Mod: GN | Performed by: SPEECH-LANGUAGE PATHOLOGIST

## 2024-01-22 NOTE — PROGRESS NOTES
DISCHARGE  Reason for Discharge: Patient chooses to discontinue therapy as he is not interested in learning electrolaryngeal voicing options any longer. He is using voice amplification which is working well for him in all environments.     Equipment Issued: none    Discharge Plan: Pt will continue to use amplification and notify Dr. Leone if there are changes in his communication abilities.    Referring Provider:  Dr. Keysha Leone

## 2024-04-30 ENCOUNTER — APPOINTMENT (OUTPATIENT)
Dept: URBAN - METROPOLITAN AREA CLINIC 254 | Age: 69
Setting detail: DERMATOLOGY
End: 2024-05-01

## 2024-04-30 DIAGNOSIS — D86.3 SARCOIDOSIS OF SKIN: ICD-10-CM

## 2024-04-30 DIAGNOSIS — L20.89 OTHER ATOPIC DERMATITIS: ICD-10-CM

## 2024-04-30 PROCEDURE — OTHER INTRALESIONAL KENALOG: OTHER

## 2024-04-30 PROCEDURE — OTHER PRESCRIPTION MEDICATION MANAGEMENT: OTHER

## 2024-04-30 PROCEDURE — OTHER MIPS QUALITY: OTHER

## 2024-04-30 PROCEDURE — 99214 OFFICE O/P EST MOD 30 MIN: CPT | Mod: 25

## 2024-04-30 PROCEDURE — OTHER PRESCRIPTION: OTHER

## 2024-04-30 PROCEDURE — OTHER COUNSELING: OTHER

## 2024-04-30 PROCEDURE — 11900 INJECT SKIN LESIONS </W 7: CPT

## 2024-04-30 RX ORDER — TACROLIMUS 1 MG/G
0.1% OINTMENT TOPICAL BID
Qty: 30 | Refills: 3 | Status: ERX | COMMUNITY
Start: 2024-04-30

## 2024-04-30 RX ORDER — TRIAMCINOLONE ACETONIDE 1 MG/G
0.1% CREAM TOPICAL BID
Qty: 1 | Refills: 1 | Status: ERX | COMMUNITY
Start: 2024-04-30

## 2024-04-30 ASSESSMENT — LOCATION ZONE DERM
LOCATION ZONE: NECK
LOCATION ZONE: FACE

## 2024-04-30 ASSESSMENT — BSA RASH: BSA RASH: 5

## 2024-04-30 ASSESSMENT — LOCATION SIMPLE DESCRIPTION DERM
LOCATION SIMPLE: LEFT ANTERIOR NECK
LOCATION SIMPLE: RIGHT CHEEK
LOCATION SIMPLE: LEFT CHEEK
LOCATION SIMPLE: RIGHT ANTERIOR NECK

## 2024-04-30 ASSESSMENT — LOCATION DETAILED DESCRIPTION DERM
LOCATION DETAILED: RIGHT LATERAL MANDIBULAR CHEEK
LOCATION DETAILED: LEFT MID PREAURICULAR CHEEK
LOCATION DETAILED: LEFT CENTRAL MALAR CHEEK
LOCATION DETAILED: LEFT INFERIOR LATERAL NECK
LOCATION DETAILED: RIGHT SUPERIOR LATERAL NECK
LOCATION DETAILED: LEFT MEDIAL MANDIBULAR CHEEK
LOCATION DETAILED: RIGHT SUPERIOR LATERAL BUCCAL CHEEK
LOCATION DETAILED: LEFT SUPERIOR LATERAL NECK
LOCATION DETAILED: LEFT SUPERIOR LATERAL BUCCAL CHEEK
LOCATION DETAILED: LEFT LATERAL BUCCAL CHEEK

## 2024-04-30 NOTE — PROCEDURE: INTRALESIONAL KENALOG
Ndc# For Kenalog Only: 2480-6688-41
X Size Of Lesion In Cm (Optional): 0
Bill For Wasted Drug (Kenalog)?: no
Kenalog Preparation: Kenalog
Validate Note Data When Using Inventory: Yes
Total Volume (Ccs): 0.4
Medical Necessity Clause: This procedure was medically necessary because the lesions that were treated were:
Detail Level: Detailed
Kenalog Type Of Vial: Multiple Dose
Consent: The risks of atrophy were reviewed with the patient.
Lot # For Kenalog (Optional): [id number]
Expiration Date For Kenalog (Optional): 12/2025
Show Inventory Tab: Hide
Concentration Of Kenalog Solution Injected (Mg/Ml): 5.0

## 2024-04-30 NOTE — HPI: RASH
What Type Of Note Output Would You Prefer (Optional)?: Standard Output
How Severe Is Your Rash?: moderate
Is This A New Presentation, Or A Follow-Up?: Rash
Additional History: Patient presents today for a rash on his face that he has had for years. He states it is inflamed and sometimes has drainage. He currently uses over the counter lotion for dry skin. Patient denies any other concerns.

## 2024-04-30 NOTE — PROCEDURE: COUNSELING
Patient Specific Counseling (Will Not Stick From Patient To Patient): **Recommended patient get his hormone levels checked with his primary care provider
Detail Level: Detailed
Patient Specific Counseling (Will Not Stick From Patient To Patient): -Recommended Eucerin advanced repair lotion for dryness\\n- Topical Steroids Counseling: I discussed with the patient that prolonged use of topical steroids can result in the increased appearance of superficial blood vessels (telangiectasias), lightening (hypopigmentation) and thinning of the skin (atrophy).  Patient understands to avoid using high potency steroids in skin folds, the groin or the face.  The patient verbalized understanding of the proper use and possible adverse effects of topical steroids.  All of the patient's questions and concerns were addressed.\\n-Protopic Counseling: Patient may experience a mild burning sensation during topical application. Protopic is not approved in children less than 2 years of age. There have been case reports of hematologic and skin malignancies in patients using topical calcineurin inhibitors although causality is questionable.

## 2024-04-30 NOTE — PROCEDURE: PRESCRIPTION MEDICATION MANAGEMENT
Detail Level: Zone
Render In Strict Bullet Format?: No
Continue Regimen: Triamcinolone 0.1% cream\\nTacrolimus 0.1% ointment

## (undated) DEVICE — GLOVE BIOGEL PI ULTRATOUCH SZ 6.5 41165

## (undated) DEVICE — SOL NACL 0.9% IRRIG 1000ML BOTTLE 2F7124

## (undated) DEVICE — ENDO VALVE SUCTION BRONCH EVIS MAJ-209

## (undated) DEVICE — Device

## (undated) DEVICE — ADH LIQUID MASTISOL TOPICAL VIAL 2-3ML 0523-48

## (undated) DEVICE — NDL ANGIOCATH 20GA 1.25" 4056

## (undated) DEVICE — NDL BLUNT 18GA 1" W/O FILTER 305181

## (undated) DEVICE — NDL SPINAL 22GA 3.5" QUINCKE 405181

## (undated) DEVICE — SU VICRYL 4-0 PS-2 18" UND J496H

## (undated) DEVICE — STRAP UNIVERSAL POSITIONING 2-PIECE 4X47X76" 91-287

## (undated) DEVICE — JELLY LUBRICATING SURGILUBE 2OZ TUBE

## (undated) DEVICE — ENDO VALVE BX EVIS MAJ-210

## (undated) DEVICE — SU VICRYL 3-0 SH 8X18" UND J864D

## (undated) DEVICE — SUCTION MANIFOLD NEPTUNE 2 SYS 4 PORT 0702-020-000

## (undated) DEVICE — PACK ENT ENDOSCOPY UMMC

## (undated) DEVICE — CLIP HORIZON SM RED WIDE SLOT 001201

## (undated) DEVICE — ENDO ADPT BRONCH SWIVEL Y A1002

## (undated) DEVICE — ANTIFOG SOLUTION W/FOAM PAD 31142527

## (undated) DEVICE — SU PROLENE 4-0 SHDA 36" 8521H

## (undated) DEVICE — LINEN TOWEL PACK X5 5464

## (undated) DEVICE — ENDO TOOTH QUICK GUARD CONFORMING PROTECTION

## (undated) DEVICE — SYR 10ML SLIP TIP W/O NDL 303134

## (undated) DEVICE — TUBE ENDOTRACHEAL 8.0MMX34CM LASER CUFFED FLEXIBLE TG0050-S

## (undated) DEVICE — NDL ECLIPSE 25GA 1" 305761

## (undated) DEVICE — RETR ELASTIC STAYS LONE STAR BLUNT DUAL LEAD 3550-1G

## (undated) DEVICE — DRSG EYE PAD STERILE 1.63X2.63" NON21600

## (undated) DEVICE — KIT ENDO FIRST STEP DISINFECTANT 200ML W/POUCH EP-4

## (undated) DEVICE — SPONGE COTTONOID 1/2X3" 80-1407

## (undated) DEVICE — TAPE DURAPORE 1"X1.5YD SILK 1538S-1

## (undated) DEVICE — SYR 10ML LL W/O NDL 302995

## (undated) DEVICE — DRSG STERI STRIP 1/2X4" R1547

## (undated) DEVICE — CLIP HORIZON MED BLUE 002200

## (undated) DEVICE — NDL BUTTERFLY 25GA .75" 367298

## (undated) DEVICE — TUBING SUCTION 10'X3/16" N510

## (undated) DEVICE — SPONGE KITTNER 30-101

## (undated) DEVICE — SYR 03ML LL W/O NDL 309657

## (undated) DEVICE — SOL WATER IRRIG 1000ML BOTTLE 2F7114

## (undated) DEVICE — SU VICRYL 4-0 SH-1 27" J315H

## (undated) DEVICE — SU PROLENE 2-0 SHDA 36" 8523H

## (undated) RX ORDER — OXYCODONE HYDROCHLORIDE 5 MG/1
TABLET ORAL
Status: DISPENSED
Start: 2023-09-13

## (undated) RX ORDER — CEFAZOLIN SODIUM/WATER 2 G/20 ML
SYRINGE (ML) INTRAVENOUS
Status: DISPENSED
Start: 2023-09-13

## (undated) RX ORDER — LIDOCAINE HYDROCHLORIDE AND EPINEPHRINE 10; 10 MG/ML; UG/ML
INJECTION, SOLUTION INFILTRATION; PERINEURAL
Status: DISPENSED
Start: 2023-09-13

## (undated) RX ORDER — CITRIC ACID/SODIUM CITRATE 334-500MG
SOLUTION, ORAL ORAL
Status: DISPENSED
Start: 2023-09-13

## (undated) RX ORDER — HYDROMORPHONE HYDROCHLORIDE 1 MG/ML
INJECTION, SOLUTION INTRAMUSCULAR; INTRAVENOUS; SUBCUTANEOUS
Status: DISPENSED
Start: 2023-09-13

## (undated) RX ORDER — FENTANYL CITRATE 50 UG/ML
INJECTION, SOLUTION INTRAMUSCULAR; INTRAVENOUS
Status: DISPENSED
Start: 2023-09-13

## (undated) RX ORDER — LIDOCAINE HYDROCHLORIDE 20 MG/ML
SOLUTION OROPHARYNGEAL
Status: DISPENSED
Start: 2023-05-09

## (undated) RX ORDER — PROPOFOL 10 MG/ML
INJECTION, EMULSION INTRAVENOUS
Status: DISPENSED
Start: 2023-09-13